# Patient Record
Sex: MALE | Race: BLACK OR AFRICAN AMERICAN | NOT HISPANIC OR LATINO | URBAN - METROPOLITAN AREA
[De-identification: names, ages, dates, MRNs, and addresses within clinical notes are randomized per-mention and may not be internally consistent; named-entity substitution may affect disease eponyms.]

---

## 2019-06-11 ENCOUNTER — EMERGENCY (EMERGENCY)
Facility: HOSPITAL | Age: 64
LOS: 1 days | Discharge: HOME | End: 2019-06-11
Attending: EMERGENCY MEDICINE | Admitting: EMERGENCY MEDICINE
Payer: MEDICAID

## 2019-06-11 VITALS
TEMPERATURE: 98 F | HEIGHT: 70 IN | RESPIRATION RATE: 17 BRPM | WEIGHT: 139.99 LBS | SYSTOLIC BLOOD PRESSURE: 159 MMHG | HEART RATE: 100 BPM | OXYGEN SATURATION: 99 % | DIASTOLIC BLOOD PRESSURE: 92 MMHG

## 2019-06-11 VITALS
HEART RATE: 90 BPM | TEMPERATURE: 99 F | RESPIRATION RATE: 18 BRPM | DIASTOLIC BLOOD PRESSURE: 87 MMHG | SYSTOLIC BLOOD PRESSURE: 144 MMHG | OXYGEN SATURATION: 97 %

## 2019-06-11 LAB
ALBUMIN SERPL ELPH-MCNC: 4.2 G/DL — SIGNIFICANT CHANGE UP (ref 3.3–5)
ALP SERPL-CCNC: 107 U/L — SIGNIFICANT CHANGE UP (ref 40–120)
ALT FLD-CCNC: 13 U/L — SIGNIFICANT CHANGE UP (ref 12–78)
AMPHET UR-MCNC: NEGATIVE — SIGNIFICANT CHANGE UP
ANION GAP SERPL CALC-SCNC: 11 MMOL/L — SIGNIFICANT CHANGE UP (ref 5–17)
APPEARANCE UR: CLEAR — SIGNIFICANT CHANGE UP
APTT BLD: 28.4 SEC — SIGNIFICANT CHANGE UP (ref 27.5–36.3)
AST SERPL-CCNC: 20 U/L — SIGNIFICANT CHANGE UP (ref 15–37)
BACTERIA # UR AUTO: ABNORMAL
BARBITURATES UR SCN-MCNC: NEGATIVE — SIGNIFICANT CHANGE UP
BENZODIAZ UR-MCNC: NEGATIVE — SIGNIFICANT CHANGE UP
BILIRUB SERPL-MCNC: 0.4 MG/DL — SIGNIFICANT CHANGE UP (ref 0.2–1.2)
BILIRUB UR-MCNC: NEGATIVE — SIGNIFICANT CHANGE UP
BUN SERPL-MCNC: 19 MG/DL — SIGNIFICANT CHANGE UP (ref 7–23)
CALCIUM SERPL-MCNC: 10 MG/DL — SIGNIFICANT CHANGE UP (ref 8.5–10.1)
CHLORIDE SERPL-SCNC: 99 MMOL/L — SIGNIFICANT CHANGE UP (ref 96–108)
CO2 SERPL-SCNC: 27 MMOL/L — SIGNIFICANT CHANGE UP (ref 22–31)
COCAINE METAB.OTHER UR-MCNC: NEGATIVE — SIGNIFICANT CHANGE UP
COLOR SPEC: YELLOW — SIGNIFICANT CHANGE UP
CREAT SERPL-MCNC: 1.2 MG/DL — SIGNIFICANT CHANGE UP (ref 0.5–1.3)
DIFF PNL FLD: ABNORMAL
EPI CELLS # UR: NEGATIVE — SIGNIFICANT CHANGE UP
GLUCOSE SERPL-MCNC: 154 MG/DL — HIGH (ref 70–99)
GLUCOSE UR QL: NEGATIVE — SIGNIFICANT CHANGE UP
HCT VFR BLD CALC: 44.2 % — SIGNIFICANT CHANGE UP (ref 39–50)
HGB BLD-MCNC: 14.3 G/DL — SIGNIFICANT CHANGE UP (ref 13–17)
INR BLD: 1.2 RATIO — HIGH (ref 0.88–1.16)
KETONES UR-MCNC: ABNORMAL
LEUKOCYTE ESTERASE UR-ACNC: NEGATIVE — SIGNIFICANT CHANGE UP
MCHC RBC-ENTMCNC: 29.3 PG — SIGNIFICANT CHANGE UP (ref 27–34)
MCHC RBC-ENTMCNC: 32.4 GM/DL — SIGNIFICANT CHANGE UP (ref 32–36)
MCV RBC AUTO: 90.6 FL — SIGNIFICANT CHANGE UP (ref 80–100)
METHADONE UR-MCNC: NEGATIVE — SIGNIFICANT CHANGE UP
NITRITE UR-MCNC: NEGATIVE — SIGNIFICANT CHANGE UP
NRBC # BLD: 0 /100 WBCS — SIGNIFICANT CHANGE UP (ref 0–0)
OPIATES UR-MCNC: POSITIVE — SIGNIFICANT CHANGE UP
PCP SPEC-MCNC: SIGNIFICANT CHANGE UP
PCP UR-MCNC: NEGATIVE — SIGNIFICANT CHANGE UP
PH UR: 7 — SIGNIFICANT CHANGE UP (ref 5–8)
PLATELET # BLD AUTO: 483 K/UL — HIGH (ref 150–400)
POTASSIUM SERPL-MCNC: 4.1 MMOL/L — SIGNIFICANT CHANGE UP (ref 3.5–5.3)
POTASSIUM SERPL-SCNC: 4.1 MMOL/L — SIGNIFICANT CHANGE UP (ref 3.5–5.3)
PROT SERPL-MCNC: 9.6 G/DL — HIGH (ref 6–8.3)
PROT UR-MCNC: 75 MG/DL
PROTHROM AB SERPL-ACNC: 13.6 SEC — HIGH (ref 10–12.9)
RBC # BLD: 4.88 M/UL — SIGNIFICANT CHANGE UP (ref 4.2–5.8)
RBC # FLD: 13.7 % — SIGNIFICANT CHANGE UP (ref 10.3–14.5)
RBC CASTS # UR COMP ASSIST: SIGNIFICANT CHANGE UP /HPF (ref 0–4)
SODIUM SERPL-SCNC: 137 MMOL/L — SIGNIFICANT CHANGE UP (ref 135–145)
SP GR SPEC: 1 — LOW (ref 1.01–1.02)
THC UR QL: NEGATIVE — SIGNIFICANT CHANGE UP
UROBILINOGEN FLD QL: NEGATIVE — SIGNIFICANT CHANGE UP
WBC # BLD: 12.62 K/UL — HIGH (ref 3.8–10.5)
WBC # FLD AUTO: 12.62 K/UL — HIGH (ref 3.8–10.5)
WBC UR QL: SIGNIFICANT CHANGE UP

## 2019-06-11 PROCEDURE — 83690 ASSAY OF LIPASE: CPT

## 2019-06-11 PROCEDURE — 80307 DRUG TEST PRSMV CHEM ANLYZR: CPT

## 2019-06-11 PROCEDURE — 71275 CT ANGIOGRAPHY CHEST: CPT | Mod: 26

## 2019-06-11 PROCEDURE — 80053 COMPREHEN METABOLIC PANEL: CPT

## 2019-06-11 PROCEDURE — 71275 CT ANGIOGRAPHY CHEST: CPT

## 2019-06-11 PROCEDURE — 93010 ELECTROCARDIOGRAM REPORT: CPT

## 2019-06-11 PROCEDURE — 85027 COMPLETE CBC AUTOMATED: CPT

## 2019-06-11 PROCEDURE — 96374 THER/PROPH/DIAG INJ IV PUSH: CPT | Mod: XU

## 2019-06-11 PROCEDURE — 99284 EMERGENCY DEPT VISIT MOD MDM: CPT | Mod: 25

## 2019-06-11 PROCEDURE — 99285 EMERGENCY DEPT VISIT HI MDM: CPT

## 2019-06-11 PROCEDURE — 96361 HYDRATE IV INFUSION ADD-ON: CPT

## 2019-06-11 PROCEDURE — 85730 THROMBOPLASTIN TIME PARTIAL: CPT

## 2019-06-11 PROCEDURE — 36415 COLL VENOUS BLD VENIPUNCTURE: CPT

## 2019-06-11 PROCEDURE — 74174 CTA ABD&PLVS W/CONTRAST: CPT | Mod: 26

## 2019-06-11 PROCEDURE — 81001 URINALYSIS AUTO W/SCOPE: CPT

## 2019-06-11 PROCEDURE — 85610 PROTHROMBIN TIME: CPT

## 2019-06-11 PROCEDURE — 96375 TX/PRO/DX INJ NEW DRUG ADDON: CPT

## 2019-06-11 PROCEDURE — 93005 ELECTROCARDIOGRAM TRACING: CPT

## 2019-06-11 PROCEDURE — 74174 CTA ABD&PLVS W/CONTRAST: CPT

## 2019-06-11 RX ORDER — ONDANSETRON 8 MG/1
4 TABLET, FILM COATED ORAL ONCE
Refills: 0 | Status: COMPLETED | OUTPATIENT
Start: 2019-06-11 | End: 2019-06-11

## 2019-06-11 RX ORDER — SODIUM CHLORIDE 9 MG/ML
1000 INJECTION INTRAMUSCULAR; INTRAVENOUS; SUBCUTANEOUS ONCE
Refills: 0 | Status: COMPLETED | OUTPATIENT
Start: 2019-06-11 | End: 2019-06-11

## 2019-06-11 RX ORDER — PANTOPRAZOLE SODIUM 20 MG/1
40 TABLET, DELAYED RELEASE ORAL ONCE
Refills: 0 | Status: COMPLETED | OUTPATIENT
Start: 2019-06-11 | End: 2019-06-11

## 2019-06-11 RX ADMIN — PANTOPRAZOLE SODIUM 40 MILLIGRAM(S): 20 TABLET, DELAYED RELEASE ORAL at 20:25

## 2019-06-11 RX ADMIN — ONDANSETRON 4 MILLIGRAM(S): 8 TABLET, FILM COATED ORAL at 20:26

## 2019-06-11 RX ADMIN — SODIUM CHLORIDE 1000 MILLILITER(S): 9 INJECTION INTRAMUSCULAR; INTRAVENOUS; SUBCUTANEOUS at 19:30

## 2019-06-11 RX ADMIN — SODIUM CHLORIDE 1000 MILLILITER(S): 9 INJECTION INTRAMUSCULAR; INTRAVENOUS; SUBCUTANEOUS at 20:27

## 2019-06-11 RX ADMIN — SODIUM CHLORIDE 1000 MILLILITER(S): 9 INJECTION INTRAMUSCULAR; INTRAVENOUS; SUBCUTANEOUS at 20:26

## 2019-06-11 NOTE — ED PROVIDER NOTE - OBJECTIVE STATEMENT
63 male presents to ER from [facility] with report of dehydration, altered mental status, generalized weakness, not getting up from bed, vomiting/hematemisis, not controlled with zofran r/o GI bleed. 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

## 2019-06-11 NOTE — ED ADULT NURSE REASSESSMENT NOTE - NS ED NURSE REASSESS COMMENT FT1
patient vomited gastric content x 2 during ED stay but none after meds given and PO challenge successful. Breathing easily, standing up without difficulty, denies abdominal pain. D/C to [facility], awaiting transport.

## 2019-06-11 NOTE — ED ADULT NURSE NOTE - CHIEF COMPLAINT QUOTE
from [facility] voluntary admission for detox from heroin has been vomiting today

## 2019-06-11 NOTE — ED PROVIDER NOTE - PROGRESS NOTE DETAILS
patient feeling well, denies abdominal pain, labs, ct results reviwed, no acute findings, patient wants to go back to [facility], spoke with NP Ewa Dexter out [facility], case discussed, aware patient going back

## 2019-06-11 NOTE — ED ADULT NURSE NOTE - OBJECTIVE STATEMENT
sent from [facility] pt voluntary admission fro heroin detox  c/o uncontrolled vomiting   pt states he uses heroin daily last used yesterday  he is unable to give daily amount of heroin used

## 2020-01-31 PROBLEM — E11.9 TYPE 2 DIABETES MELLITUS WITHOUT COMPLICATIONS: Chronic | Status: ACTIVE | Noted: 2019-06-11

## 2020-01-31 PROBLEM — I10 ESSENTIAL (PRIMARY) HYPERTENSION: Chronic | Status: ACTIVE | Noted: 2019-06-11

## 2020-01-31 PROBLEM — F11.10 OPIOID ABUSE, UNCOMPLICATED: Chronic | Status: ACTIVE | Noted: 2019-06-11

## 2020-02-10 ENCOUNTER — APPOINTMENT (OUTPATIENT)
Dept: SURGERY | Facility: CLINIC | Age: 65
End: 2020-02-10

## 2020-02-13 ENCOUNTER — APPOINTMENT (OUTPATIENT)
Dept: SURGERY | Facility: CLINIC | Age: 65
End: 2020-02-13

## 2020-02-21 NOTE — DATA REVIEWED
[FreeTextEntry1] : Patient: JADEN MORRELL\par YOB: 1955\par Date of Exam: 08-\par  \par EXAM:  ULTRASOUND ABDOMEN COMPLETE\par \par HISTORY:  Recent blood loss\par \par TECHNIQUE:  Using real-time ultrasonography, the abdomen was imaged. Longitudinal and transverse images were obtained. Grayscale and color Doppler imaging is utilized. Static images are provided for review. \par \par COMPARISON:  No old study is available \par \par FINDINGS:  The liver is normal in size and slightly heterogeneous which may represent mild underlying hepatocellular disease. No space-occupying lesions are identified. Portal vein demonstrates normal hepatopedal flow.\par \par Multiple gallstones are present. Gallbladder wall is not thickened.\par \par The common bile duct was identified and measured 0.3 cm. There is no intrahepatic ductal dilation.  \par \par Suboptimal visualization of pancreas by the patient's body habitus.\par \par The kidneys are normal in size, shape, and position.\par The right kidney measures 11.3 x 3.8 x 6.0 cm. Upper pole simple cyst measures 0.8 x 0.8 x 0.9 cm.\par The left kidney measures 12.0 x 5.9 x 6.7 cm. \par There is no evidence of hydronephrosis. No definite stones are identified.  Renal cortex demonstrates normal echogenicity.  \par \par The spleen appears normal measuring approximately 9.1 x 3.7 x 3.3 cm.  \par \par Visualized aorta is not dilated with normal blood flow.\par \par Normal flow is identified within the inferior vena cava.\par \par IMPRESSION:  \par 1. Normal size heterogeneous liver which may represent mild underlying steatosis.\par 2. Cholelithiasis with a normal common bile duct.\par 3. Poor visualization of pancreas.\par 4. Simple cyst upper pole right kidney. \par \par \par \par \par \par Date of Exam: 01-\par \par EXAM:  CT CHEST, ABDOMEN AND PELVIS WITH CONTRAST\par Note - This patient has received 1 CT studies and 0 Myocardial Perfusion studies within our network over the previous 12 month period.\par \par HISTORY:  Persistent eosinophilia.\par TECHNIQUE:  CT scan of the chest was performed. Routine axial, sagittal and coronal, and axial MIP reconstructed images were reviewed. Patient received 100 mL Optiray 350 IV contrast from a 100 mL vial was given. One or more of the following dose reduction techniques were used: automated exposure control, adjustment of the mA and/or kV according to patient size, use of iterative reconstruction technique. \par \par COMPARISON:  Correlation made with CT scan the chest 3/6/2019.\par \par FINDINGS: \par CHEST:\par Osseous structures are unremarkable.\par Thyroid gland is normal in size.\par Anterior chest wall and axillary regions are normal.\par No thoracic lymphadenopathy. Numerous normal-sized lymph nodes are identified.\par No endobronchial lesions are identified.\par Increased markings are identified with a mild predilection for the mid and upper lung zones right greater than left is relatively stable. Areas of paraseptal emphysema with subpleural cysts identified are stable.\par No occult pulmonary mass.\par Mild interstitial changes identified at the bases.\par \par ABDOMEN AND PELVIS:\par Liver: Unremarkable.\par Spleen: Unremarkable.\par Gallbladder: Question small gallbladder polyps which can be confirmed with sonography.\par Bile ducts: No biliary ductal dilatation.\par Pancreas: Unremarkable. No masses. The main pancreatic duct is not dilated. No peripancreatic abnormalities are seen.\par Adrenal glands: Unremarkable. No nodules.\par Kidneys: The kidneys enhance symmetrically without hydronephrosis. No obvious renal calculi are seen.\par Bowel and mesentery: Mild thickening of the colonic wall distally could represent mild colitis. This predominantly involves the distal descending colon and sigmoid colon. No small bowel involvement.\par Visualized abdominal aorta and IVC: Mild calcification of the aorta and iliac arteries with no dilatation. IVC is unremarkable.\par Lymph nodes: No abdominal, pelvic lymphadenopathy.\par Ureters and Urinary bladder: Unremarkable.\par Slightly prominent and nodular prostate.\par Visualized bony structures: Mild degenerative changes L3-4 level.\par Other comments: None.\par \par IMPRESSION:  \par 1. Stable interstitial changes with a predilection for the mid and upper lung zones with areas of paraseptal emphysema.\par 2. No occult pulmonary mass or lymphadenopathy.\par 3. Question mild colitis involving the distal descending colon and sigmoid colon extending into the rectum.\par 4. Question small gallbladder polyps which can be confirmed with sonography.\par

## 2020-02-21 NOTE — HISTORY OF PRESENT ILLNESS
[de-identified] : JADEN MORRELL is referred to the office for consultation visit, he presents w the cc\par Patient had an Abd US done 08/08/19; results c/w Normal size heterogeneous liver which may represent mild underlying steatosis/ Cholelithiasis with a normal common bile duct.\par CT of Abd/Pelvis from 01/22/20 showed stable interstitial changes with a predilection for the mid and upper lung zones with areas of paraseptal emphysema.  No occult pulmonary mass or lymphadenopathy. Question mild colitis involving the distal descending colon and sigmoid colon extending into the rectum/ Question small gallbladder polyps which can be confirmed with sonography.

## 2020-02-24 ENCOUNTER — APPOINTMENT (OUTPATIENT)
Dept: SURGERY | Facility: CLINIC | Age: 65
End: 2020-02-24

## 2021-01-03 NOTE — ED ADULT NURSE NOTE - NS ED NURSE LEVEL OF CONSCIOUSNESS AFFECT
Goals:  -diagnostic tests and consults completed and resulted-partially met, colorectal following  -vital signs normal or at patient baseline- partially met, tachycardic   Calm

## 2021-03-29 ENCOUNTER — TRANSCRIPTION ENCOUNTER (OUTPATIENT)
Age: 66
End: 2021-03-29

## 2021-03-29 ENCOUNTER — INPATIENT (INPATIENT)
Facility: HOSPITAL | Age: 66
LOS: 0 days | Discharge: LEFT BEFORE DISCHARGE | End: 2021-03-29
Attending: INTERNAL MEDICINE | Admitting: INTERNAL MEDICINE
Payer: MEDICAID

## 2021-03-29 VITALS
HEART RATE: 97 BPM | RESPIRATION RATE: 25 BRPM | DIASTOLIC BLOOD PRESSURE: 96 MMHG | SYSTOLIC BLOOD PRESSURE: 141 MMHG | OXYGEN SATURATION: 96 %

## 2021-03-29 VITALS
WEIGHT: 164.91 LBS | OXYGEN SATURATION: 99 % | RESPIRATION RATE: 16 BRPM | DIASTOLIC BLOOD PRESSURE: 85 MMHG | SYSTOLIC BLOOD PRESSURE: 134 MMHG | HEART RATE: 75 BPM | HEIGHT: 70 IN | TEMPERATURE: 98 F

## 2021-03-29 DIAGNOSIS — F14.20 COCAINE DEPENDENCE, UNCOMPLICATED: ICD-10-CM

## 2021-03-29 DIAGNOSIS — F11.20 OPIOID DEPENDENCE, UNCOMPLICATED: ICD-10-CM

## 2021-03-29 LAB
ALBUMIN SERPL ELPH-MCNC: 3.1 G/DL — LOW (ref 3.3–5)
ALP SERPL-CCNC: 63 U/L — SIGNIFICANT CHANGE UP (ref 40–120)
ALT FLD-CCNC: 13 U/L — SIGNIFICANT CHANGE UP (ref 12–78)
ANION GAP SERPL CALC-SCNC: 6 MMOL/L — SIGNIFICANT CHANGE UP (ref 5–17)
APTT BLD: 36.1 SEC — HIGH (ref 27.5–35.5)
AST SERPL-CCNC: 16 U/L — SIGNIFICANT CHANGE UP (ref 15–37)
BASOPHILS # BLD AUTO: 0.05 K/UL — SIGNIFICANT CHANGE UP (ref 0–0.2)
BASOPHILS NFR BLD AUTO: 0.5 % — SIGNIFICANT CHANGE UP (ref 0–2)
BILIRUB SERPL-MCNC: 0.5 MG/DL — SIGNIFICANT CHANGE UP (ref 0.2–1.2)
BUN SERPL-MCNC: 10 MG/DL — SIGNIFICANT CHANGE UP (ref 7–23)
CALCIUM SERPL-MCNC: 8.6 MG/DL — SIGNIFICANT CHANGE UP (ref 8.5–10.1)
CHLORIDE SERPL-SCNC: 106 MMOL/L — SIGNIFICANT CHANGE UP (ref 96–108)
CO2 SERPL-SCNC: 28 MMOL/L — SIGNIFICANT CHANGE UP (ref 22–31)
CREAT SERPL-MCNC: 1.02 MG/DL — SIGNIFICANT CHANGE UP (ref 0.5–1.3)
D DIMER BLD IA.RAPID-MCNC: 413 NG/ML DDU — HIGH
EOSINOPHIL # BLD AUTO: 0.29 K/UL — SIGNIFICANT CHANGE UP (ref 0–0.5)
EOSINOPHIL NFR BLD AUTO: 3.2 % — SIGNIFICANT CHANGE UP (ref 0–6)
FLUAV AG NPH QL: SIGNIFICANT CHANGE UP
FLUBV AG NPH QL: SIGNIFICANT CHANGE UP
GLUCOSE BLDC GLUCOMTR-MCNC: 100 MG/DL — HIGH (ref 70–99)
GLUCOSE BLDC GLUCOMTR-MCNC: 110 MG/DL — HIGH (ref 70–99)
GLUCOSE SERPL-MCNC: 92 MG/DL — SIGNIFICANT CHANGE UP (ref 70–99)
HCT VFR BLD CALC: 41.7 % — SIGNIFICANT CHANGE UP (ref 39–50)
HGB BLD-MCNC: 12.8 G/DL — LOW (ref 13–17)
IMM GRANULOCYTES NFR BLD AUTO: 0.5 % — SIGNIFICANT CHANGE UP (ref 0–1.5)
INR BLD: 1.1 RATIO — SIGNIFICANT CHANGE UP (ref 0.88–1.16)
LYMPHOCYTES # BLD AUTO: 1.62 K/UL — SIGNIFICANT CHANGE UP (ref 1–3.3)
LYMPHOCYTES # BLD AUTO: 17.7 % — SIGNIFICANT CHANGE UP (ref 13–44)
MAGNESIUM SERPL-MCNC: 1.9 MG/DL — SIGNIFICANT CHANGE UP (ref 1.6–2.6)
MCHC RBC-ENTMCNC: 30 PG — SIGNIFICANT CHANGE UP (ref 27–34)
MCHC RBC-ENTMCNC: 30.7 GM/DL — LOW (ref 32–36)
MCV RBC AUTO: 97.7 FL — SIGNIFICANT CHANGE UP (ref 80–100)
MONOCYTES # BLD AUTO: 0.49 K/UL — SIGNIFICANT CHANGE UP (ref 0–0.9)
MONOCYTES NFR BLD AUTO: 5.3 % — SIGNIFICANT CHANGE UP (ref 2–14)
NEUTROPHILS # BLD AUTO: 6.67 K/UL — SIGNIFICANT CHANGE UP (ref 1.8–7.4)
NEUTROPHILS NFR BLD AUTO: 72.8 % — SIGNIFICANT CHANGE UP (ref 43–77)
NRBC # BLD: 0 /100 WBCS — SIGNIFICANT CHANGE UP (ref 0–0)
NT-PROBNP SERPL-SCNC: 1674 PG/ML — HIGH (ref 0–125)
PLATELET # BLD AUTO: 307 K/UL — SIGNIFICANT CHANGE UP (ref 150–400)
POTASSIUM SERPL-MCNC: 4 MMOL/L — SIGNIFICANT CHANGE UP (ref 3.5–5.3)
POTASSIUM SERPL-SCNC: 4 MMOL/L — SIGNIFICANT CHANGE UP (ref 3.5–5.3)
PROCALCITONIN SERPL-MCNC: 0.05 NG/ML — SIGNIFICANT CHANGE UP (ref 0.02–0.1)
PROT SERPL-MCNC: 7.8 GM/DL — SIGNIFICANT CHANGE UP (ref 6–8.3)
PROTHROM AB SERPL-ACNC: 12.7 SEC — SIGNIFICANT CHANGE UP (ref 10.6–13.6)
RBC # BLD: 4.27 M/UL — SIGNIFICANT CHANGE UP (ref 4.2–5.8)
RBC # FLD: 12.9 % — SIGNIFICANT CHANGE UP (ref 10.3–14.5)
SARS-COV-2 RNA SPEC QL NAA+PROBE: SIGNIFICANT CHANGE UP
SODIUM SERPL-SCNC: 140 MMOL/L — SIGNIFICANT CHANGE UP (ref 135–145)
TROPONIN I SERPL-MCNC: 0.03 NG/ML — SIGNIFICANT CHANGE UP (ref 0.01–0.04)
WBC # BLD: 9.17 K/UL — SIGNIFICANT CHANGE UP (ref 3.8–10.5)
WBC # FLD AUTO: 9.17 K/UL — SIGNIFICANT CHANGE UP (ref 3.8–10.5)

## 2021-03-29 PROCEDURE — 71275 CT ANGIOGRAPHY CHEST: CPT | Mod: 26,MH

## 2021-03-29 PROCEDURE — 90792 PSYCH DIAG EVAL W/MED SRVCS: CPT

## 2021-03-29 PROCEDURE — 99223 1ST HOSP IP/OBS HIGH 75: CPT

## 2021-03-29 PROCEDURE — 99285 EMERGENCY DEPT VISIT HI MDM: CPT

## 2021-03-29 PROCEDURE — 71045 X-RAY EXAM CHEST 1 VIEW: CPT | Mod: 26

## 2021-03-29 PROCEDURE — 93010 ELECTROCARDIOGRAM REPORT: CPT

## 2021-03-29 RX ORDER — DEXTROSE 50 % IN WATER 50 %
25 SYRINGE (ML) INTRAVENOUS ONCE
Refills: 0 | Status: DISCONTINUED | OUTPATIENT
Start: 2021-03-29 | End: 2021-03-29

## 2021-03-29 RX ORDER — ATORVASTATIN CALCIUM 80 MG/1
10 TABLET, FILM COATED ORAL AT BEDTIME
Refills: 0 | Status: DISCONTINUED | OUTPATIENT
Start: 2021-03-29 | End: 2021-03-29

## 2021-03-29 RX ORDER — INSULIN LISPRO 100/ML
VIAL (ML) SUBCUTANEOUS
Refills: 0 | Status: DISCONTINUED | OUTPATIENT
Start: 2021-03-29 | End: 2021-03-29

## 2021-03-29 RX ORDER — HEPARIN SODIUM 5000 [USP'U]/ML
5000 INJECTION INTRAVENOUS; SUBCUTANEOUS EVERY 12 HOURS
Refills: 0 | Status: DISCONTINUED | OUTPATIENT
Start: 2021-03-29 | End: 2021-03-29

## 2021-03-29 RX ORDER — METOPROLOL TARTRATE 50 MG
25 TABLET ORAL DAILY
Refills: 0 | Status: DISCONTINUED | OUTPATIENT
Start: 2021-03-29 | End: 2021-03-29

## 2021-03-29 RX ORDER — GLUCAGON INJECTION, SOLUTION 0.5 MG/.1ML
1 INJECTION, SOLUTION SUBCUTANEOUS ONCE
Refills: 0 | Status: DISCONTINUED | OUTPATIENT
Start: 2021-03-29 | End: 2021-03-29

## 2021-03-29 RX ORDER — SODIUM CHLORIDE 9 MG/ML
1000 INJECTION, SOLUTION INTRAVENOUS
Refills: 0 | Status: DISCONTINUED | OUTPATIENT
Start: 2021-03-29 | End: 2021-03-29

## 2021-03-29 RX ORDER — FUROSEMIDE 40 MG
40 TABLET ORAL ONCE
Refills: 0 | Status: COMPLETED | OUTPATIENT
Start: 2021-03-29 | End: 2021-03-29

## 2021-03-29 RX ORDER — METFORMIN HYDROCHLORIDE 850 MG/1
1 TABLET ORAL
Qty: 0 | Refills: 0 | DISCHARGE

## 2021-03-29 RX ORDER — ASPIRIN/CALCIUM CARB/MAGNESIUM 324 MG
81 TABLET ORAL DAILY
Refills: 0 | Status: DISCONTINUED | OUTPATIENT
Start: 2021-03-30 | End: 2021-03-29

## 2021-03-29 RX ORDER — FUROSEMIDE 40 MG
40 TABLET ORAL DAILY
Refills: 0 | Status: DISCONTINUED | OUTPATIENT
Start: 2021-03-29 | End: 2021-03-29

## 2021-03-29 RX ORDER — DEXTROSE 50 % IN WATER 50 %
15 SYRINGE (ML) INTRAVENOUS ONCE
Refills: 0 | Status: DISCONTINUED | OUTPATIENT
Start: 2021-03-29 | End: 2021-03-29

## 2021-03-29 RX ORDER — DEXTROSE 50 % IN WATER 50 %
12.5 SYRINGE (ML) INTRAVENOUS ONCE
Refills: 0 | Status: DISCONTINUED | OUTPATIENT
Start: 2021-03-29 | End: 2021-03-29

## 2021-03-29 RX ORDER — ASPIRIN/CALCIUM CARB/MAGNESIUM 324 MG
325 TABLET ORAL ONCE
Refills: 0 | Status: COMPLETED | OUTPATIENT
Start: 2021-03-29 | End: 2021-03-29

## 2021-03-29 RX ADMIN — Medication 325 MILLIGRAM(S): at 08:46

## 2021-03-29 RX ADMIN — Medication 40 MILLIGRAM(S): at 11:50

## 2021-03-29 NOTE — CONSULT NOTE ADULT - ASSESSMENT
`The chart has been reviewed but the patient has not yet been examined.  Full note to follow. 65y Male with a known h/o DM 2 (on OHG agents), HTN, HLD, active daily  drug  abuser (heroin and cocaine - uses >20 bags qd as per pt).    He presented to the ED with c/o dyspnea, pleuritic left sided non-radiating chest wall pain. No h/o fever, NV, abd pain. he is a current smoker ~1ppd.    No evidence of significant heart failure clinically; CT findings of increased pulmonary markings represent pulm fibrosis, mor likely.  Consider pulm consultation.    Empiric Lasix as needed.  Continue Enalapril.  TTE pending.        Patient s/o AMA, will follow as needed.

## 2021-03-29 NOTE — CONSULT NOTE ADULT - SUBJECTIVE AND OBJECTIVE BOX
CARDIOLOGY CONSULTATION NOTE                                                                             JADEN MORRELL is a 65y Male with a known h/o DM 2 (on OHG agents), HTN, HLD, active daily  drug  abuser (heroin and cocaine - snorts about 7 bags qd as per pt).  He presented to the ED with c/o dyspnea, pleuritic left sided non-radiating chest wall pain. No h/o fever, NV, abd pain. he is a current smoker ~1ppd. He denies  etoh use. Pt lives with his sister and does not know his meds (29 Mar 2021 11:59)   REVIEW OF SYSTEMS: -----------------------------  CONSTITUTIONAL: No fever, weight loss, or fatigue EYES: No eye pain, visual disturbances, or discharge ENMT:  No difficulty hearing, tinnitus, vertigo; No sinus or throat pain NECK: No pain or stiffness BREASTS: No pain, masses, or nipple discharge RESPIRATORY: No cough, wheezing, chills or hemoptysis; No shortness of breath CARDIOVASCULAR: See HPI GASTROINTESTINAL: No abdominal or epigastric pain. No nausea, vomiting, or hematemesis; No diarrhea or constipation. No melena or hematochezia. GENITOURINARY: No dysuria, frequency, hematuria, or incontinence NEUROLOGICAL: No headaches, memory loss, loss of strength, numbness, or tremors SKIN: No itching, burning, rashes, or lesions  LYMPH NODES: No enlarged glands ENDOCRINE: No heat or cold intolerance; No hair loss MUSCULOSKELETAL: No joint pain or swelling; No muscle, back, or extremity pain PSYCHIATRIC: No depression, anxiety, mood swings, or difficulty sleeping HEME/LYMPH: No easy bruising, or bleeding gums ALLERGY AND IMMUNOLOGIC: No hives or eczema  Home Medications: enalapril:  (12 Jun 2019 14:33) enalapril 5 mg oral tablet: 1 tab(s) orally once a day (at bedtime) (21 Nov 2014 14:15) Januvia 100 mg oral tablet: 1 tab(s) orally once a day (21 Nov 2014 14:33) metFORMIN 1000 mg oral tablet: 1 tab(s) orally 2 times a day (21 Nov 2014 14:33) metFORMIN 500 mg oral tablet: 1 tab(s) orally 2 times a day (12 Jun 2019 14:33)   MEDICATIONS  (STANDING): aspirin enteric coated 81 milliGRAM(s) Oral daily atorvastatin 10 milliGRAM(s) Oral at bedtime enalapril 5 milliGRAM(s) Oral daily furosemide   Injectable 40 milliGRAM(s) IV Push daily glucagon  Injectable 1 milliGRAM(s) IntraMuscular once heparin   Injectable 5000 Unit(s) SubCutaneous every 12 hours insulin lispro (ADMELOG) corrective regimen sliding scale   SubCutaneous three times a day before meals   ALLERGIES: penicillin (Rash) penicillin (Unknown)   FAMILY HISTORY: Family history of hypertension (Father)  Family history of diabetes mellitus (Mother)    PHYSICAL EXAMINATION: ----------------------------- T(C): 36.6 (03-29-21 @ 10:53), Max: 36.6 (03-29-21 @ 07:52) HR: 97 (03-29-21 @ 12:58) (75 - 100) BP: 141/96 (03-29-21 @ 12:58) (134/85 - 159/103) RR: 25 (03-29-21 @ 12:58) (16 - 25) SpO2: 96% (03-29-21 @ 12:58) (95% - 99%) Wt(kg): --  Height (cm): 177.8 (03-29 @ 07:52) Weight (kg): 74.8 (03-29 @ 07:52) BMI (kg/m2): 23.7 (03-29 @ 07:52) BSA (m2): 1.92 (03-29 @ 07:52)  Constitutional: well developed, normal appearance, well groomed, well nourished, no deformities and no acute distress.  Eyes: the conjunctiva exhibited no abnormalities and the eyelids demonstrated no xanthelasmas.  HEENT: normal oral mucosa, no oral pallor and no oral cyanosis.  Neck: normal jugular venous A waves present, normal jugular venous V waves present and no jugular venous karimi A waves.  Pulmonary: no respiratory distress, normal respiratory rhythm and effort, no accessory muscle use and lungs were clear to auscultation bilaterally.  Cardiovascular: heart rate and rhythm were normal, normal S1 and S2 and no murmur, gallop, rub, heave or thrill are present.  Abdomen: soft, non-tender, no hepato-splenomegaly and no abdominal mass palpated.  Musculoskeletal: the gait could not be assessed..  Extremities: no clubbing of the fingernails, no localized cyanosis, no petechial hemorrhages and no ischemic changes.  Skin: normal skin color and pigmentation, no rash, no venous stasis, no skin lesions, no skin ulcer and no xanthoma was observed.  Psychiatric: oriented to person, place, and time, the affect was normal, the mood was normal and not feeling anxious.   ECG: -------  < from: 12 Lead ECG (03.29.21 @ 08:05) >  Ventricular Rate 85 BPM  Atrial Rate 85 BPM  P-R Interval 152 ms  QRS Duration 92 ms  Q-T Interval 386 ms  QTC Calculation(Bazett) 459 ms  P Axis 63 degrees  R Axis 72 degrees  T Axis 116 degrees  Diagnosis Line Normal sinus rhythm Possible Left atrial enlargement Left ventricular hypertrophy T wave abnormality, consider lateral ischemia Abnormal ECG No previous ECGs available Confirmed by Max Peña MD ([id number]) on 3/29/2021 1:22:24 PM  < end of copied text >   LABS:  -------- 03-29  140  |  106  |  10 ----------------------------<  92 4.0   |  28  |  1.02  Ca    8.6      29 Mar 2021 08:16 Mg     1.9     03-29  TPro  7.8  /  Alb  3.1<L>  /  TBili  0.5  /  DBili  x   /  AST  16  /  ALT  13  /  AlkPhos  63  03-29                       12.8  9.17  )-----------( 307      ( 29 Mar 2021 08:16 )            41.7    PT/INR - ( 29 Mar 2021 08:16 )   PT: 12.7 sec;   INR: 1.10 ratio      PTT - ( 29 Mar 2021 08:16 )  PTT:36.1 sec 03-29 @ 08:16 BNP: 1674 pg/mL  03-29 @ 08:16 CPK total:--, CKMB --, Troponin I - .030 ng/mL     RADIOLOGY REPORTS: -----------------------------  < from: CT Angio Chest w/ IV Cont (03.29.21 @ 10:52) >  EXAM:  CT ANGIO CHEST (W)AW IC                         PROCEDURE DATE:  03/29/2021      INTERPRETATION:  CLINICAL INFORMATION: Chest pain  COMPARISON: 6/11/2019  CONTRAST/COMPLICATIONS: IV Contrast: Omnipaque 350  85 cc administered   15 cc discarded Oral Contrast: NONE Complications: None reported at time of study completion  PROCEDURE: CT Angiography of the Chest. Sagittal and coronal reformats were performed as well as 3D (MIP) reconstructions.  FINDINGS:  LUNGS AND AIRWAYS: Patent central airways.  Emphysema. Bilateral groundglass opacities and septal thickening. Fibrotic changes (subpleural cysts). PLEURA: Small bilateral pleural effusions. MEDIASTINUM AND DEIRDRE: Prominent lymph nodes. VESSELS: No pulmonary arterial filling defects. HEART: Heart size is normal. No pericardial effusion. CHEST WALL AND LOWER NECK: Within normal limits. VISUALIZED UPPER ABDOMEN: Reflux of intravenous contrast into the inferior vena cava and hepatic veins. BONES: Within normal limits.  IMPRESSION: Negative for pulmonary embolism. Findings of pulmonary fibrosis and pulmonary edema. Small bilateral pleural effusions.   PAVAN DE DIOS MD; Attending Radiologist This document has been electronically signed. Mar29 2021 11:25AM  < end of copied text >   ECHOCARDIOGRAM: --------------------------- pending      CARDIOLOGY CONSULTATION NOTE                                                                             JADEN MORRELL is a 65y Male with a known h/o DM 2 (on OHG agents), HTN, HLD, active daily  drug  abuser (heroin and cocaine - snorts about 7 bags qd as per pt).  He presented to the ED with c/o dyspnea, pleuritic left sided non-radiating chest wall pain. No h/o fever, NV, abd pain. he is a current smoker ~1ppd. He denies  etoh use. Pt lives with his sister and does not know his meds (29 Mar 2021 11:59)   REVIEW OF SYSTEMS: -----------------------------  CONSTITUTIONAL: No fever, weight loss, or fatigue EYES: No eye pain, visual disturbances, or discharge ENMT:  No difficulty hearing, tinnitus, vertigo; No sinus or throat pain NECK: No pain or stiffness BREASTS: No pain, masses, or nipple discharge RESPIRATORY: No cough, wheezing, chills or hemoptysis; No shortness of breath CARDIOVASCULAR: See HPI GASTROINTESTINAL: No abdominal or epigastric pain. No nausea, vomiting, or hematemesis; No diarrhea or constipation. No melena or hematochezia. GENITOURINARY: No dysuria, frequency, hematuria, or incontinence NEUROLOGICAL: No headaches, memory loss, loss of strength, numbness, or tremors SKIN: No itching, burning, rashes, or lesions  LYMPH NODES: No enlarged glands ENDOCRINE: No heat or cold intolerance; No hair loss MUSCULOSKELETAL: No joint pain or swelling; No muscle, back, or extremity pain PSYCHIATRIC: No depression, anxiety, mood swings, or difficulty sleeping HEME/LYMPH: No easy bruising, or bleeding gums ALLERGY AND IMMUNOLOGIC: No hives or eczema  Home Medications: enalapril:  (12 Jun 2019 14:33) enalapril 5 mg oral tablet: 1 tab(s) orally once a day (at bedtime) (21 Nov 2014 14:15) Januvia 100 mg oral tablet: 1 tab(s) orally once a day (21 Nov 2014 14:33) metFORMIN 1000 mg oral tablet: 1 tab(s) orally 2 times a day (21 Nov 2014 14:33) metFORMIN 500 mg oral tablet: 1 tab(s) orally 2 times a day (12 Jun 2019 14:33)   MEDICATIONS  (STANDING): aspirin enteric coated 81 milliGRAM(s) Oral daily atorvastatin 10 milliGRAM(s) Oral at bedtime enalapril 5 milliGRAM(s) Oral daily furosemide   Injectable 40 milliGRAM(s) IV Push daily glucagon  Injectable 1 milliGRAM(s) IntraMuscular once heparin   Injectable 5000 Unit(s) SubCutaneous every 12 hours insulin lispro (ADMELOG) corrective regimen sliding scale   SubCutaneous three times a day before meals   ALLERGIES: penicillin (Rash) penicillin (Unknown)   FAMILY HISTORY: Family history of hypertension (Father)  Family history of diabetes mellitus (Mother)    PHYSICAL EXAMINATION: ----------------------------- T(C): 36.6 (03-29-21 @ 10:53), Max: 36.6 (03-29-21 @ 07:52) HR: 97 (03-29-21 @ 12:58) (75 - 100) BP: 141/96 (03-29-21 @ 12:58) (134/85 - 159/103) RR: 25 (03-29-21 @ 12:58) (16 - 25) SpO2: 96% (03-29-21 @ 12:58) (95% - 99%) Wt(kg): --  Height (cm): 177.8 (03-29 @ 07:52) Weight (kg): 74.8 (03-29 @ 07:52) BMI (kg/m2): 23.7 (03-29 @ 07:52) BSA (m2): 1.92 (03-29 @ 07:52)  Constitutional: well developed, normal appearance, well groomed, well nourished, no deformities and no acute distress.  Eyes: the conjunctiva exhibited no abnormalities and the eyelids demonstrated no xanthelasmas.  HEENT: normal oral mucosa, no oral pallor and no oral cyanosis.  Neck: normal jugular venous A waves present, normal jugular venous V waves present and no jugular venous karimi A waves.  Pulmonary: no respiratory distress, normal respiratory rhythm and effort, no accessory muscle use and lungs were clear to auscultation bilaterally.  Cardiovascular: heart rate and rhythm were normal, normal S1 and S2 and no murmur, gallop, rub, heave or thrill are present.  Abdomen: soft, non-tender, no hepato-splenomegaly and no abdominal mass palpated.  Musculoskeletal: the gait could not be assessed..  Extremities: no clubbing of the fingernails, no localized cyanosis, no petechial hemorrhages and no ischemic changes.  Skin: normal skin color and pigmentation, no rash, no venous stasis, no skin lesions, no skin ulcer and no xanthoma was observed.  Psychiatric: oriented to person, place, and time, the affect was normal, the mood was normal and not feeling anxious.   ECG: -------  < from: 12 Lead ECG (03.29.21 @ 08:05) >  Ventricular Rate 85 BPM  Atrial Rate 85 BPM  P-R Interval 152 ms  QRS Duration 92 ms  Q-T Interval 386 ms  QTC Calculation(Bazett) 459 ms  P Axis 63 degrees  R Axis 72 degrees  T Axis 116 degrees  Diagnosis Line Normal sinus rhythm Possible Left atrial enlargement Left ventricular hypertrophy T wave abnormality, consider lateral ischemia Abnormal ECG No previous ECGs available Confirmed by Max Peña MD ([id number]) on 3/29/2021 1:22:24 PM  < end of copied text >   LABS:  -------- 03-29  140  |  106  |  10 ----------------------------<  92 4.0   |  28  |  1.02  Ca    8.6      29 Mar 2021 08:16 Mg     1.9     03-29  TPro  7.8  /  Alb  3.1<L>  /  TBili  0.5  /  DBili  x   /  AST  16  /  ALT  13  /  AlkPhos  63  03-29                       12.8  9.17  )-----------( 307      ( 29 Mar 2021 08:16 )            41.7    PT/INR - ( 29 Mar 2021 08:16 )   PT: 12.7 sec;   INR: 1.10 ratio      PTT - ( 29 Mar 2021 08:16 )  PTT:36.1 sec 03-29 @ 08:16 BNP: 1674 pg/mL  03-29 @ 08:16 CPK total:--, CKMB --, Troponin I - .030 ng/mL     RADIOLOGY REPORTS: -----------------------------  < from: CT Angio Chest w/ IV Cont (03.29.21 @ 10:52) >  EXAM:  CT ANGIO CHEST (W)AW IC                         PROCEDURE DATE:  03/29/2021      INTERPRETATION:  CLINICAL INFORMATION: Chest pain  COMPARISON: 6/11/2019  CONTRAST/COMPLICATIONS: IV Contrast: Omnipaque 350  85 cc administered   15 cc discarded Oral Contrast: NONE Complications: None reported at time of study completion  PROCEDURE: CT Angiography of the Chest. Sagittal and coronal reformats were performed as well as 3D (MIP) reconstructions.  FINDINGS:  LUNGS AND AIRWAYS: Patent central airways.  Emphysema. Bilateral groundglass opacities and septal thickening. Fibrotic changes (subpleural cysts). PLEURA: Small bilateral pleural effusions. MEDIASTINUM AND DEIRDRE: Prominent lymph nodes. VESSELS: No pulmonary arterial filling defects. HEART: Heart size is normal. No pericardial effusion. CHEST WALL AND LOWER NECK: Within normal limits. VISUALIZED UPPER ABDOMEN: Reflux of intravenous contrast into the inferior vena cava and hepatic veins. BONES: Within normal limits.  IMPRESSION: Negative for pulmonary embolism. Findings of pulmonary fibrosis and pulmonary edema. Small bilateral pleural effusions.   PAVAN DE DIOS MD; Attending Radiologist This document has been electronically signed. Mar29 2021 11:25AM  < end of copied text >   ECHOCARDIOGRAM: ---------------------------

## 2021-03-29 NOTE — H&P ADULT - HISTORY OF PRESENT ILLNESS
64yo male     with pmh DM 2,  , HTN, HLD,        active daily  drug  abuser, + heroine  and  +cocaine .  dialy snorting,  about  7  bags  qd, per pt       presents with sob, pleuritic left sided cp, nonradiating.       denies fever, NV, abd pain. + smoker  1pp/day   pt denies  etoh use    has  new  onset chf  , in  er    pt lives  with his sister   and  does  not   know his meds 64yo male     with pmh DM 2,on metformin,   , HTN, HLD,        active daily  drug  abuser, + heroine  and  +cocaine .  dialy snorting,  about  7  bags  qd, per pt       presents with sob, pleuritic left sided cp, nonradiating.       denies fever, NV, abd pain. + smoker  1pp/day   pt denies  etoh use    has  new  onset chf  , in  er    pt lives  with his sister   and  does  not   know his meds

## 2021-03-29 NOTE — DISCHARGE NOTE PROVIDER - HOSPITAL COURSE
65  yr male,       h/o  DM 2 on metformin,   HTN, HLD,      does not  know his meds/ ?  non compliance  with meds     active daily  drug  abuser, + heroine  and  +cocaine .  daily  snorting,  about  7  bags  qd, per pt .  daily smoker     pt  is unemployed   and lives  with his sister         Admitted  with sob, pleuritic left sided cp, nonradiating., for past  week  or more       cp has   resolved               1.  sob,  from  Acute Diastolic Chf          Ct chest  angio, no PE/ pulm fibrosis and pulm edema            cp, sob, chf,  pneumonitis, pulm fibrosis, all known to occur as  a consequence of   illicit drug inhalation           normal  troponin. elevated   BNP      2.  Ekg, ,T  wave inversions            card dr ivey          on iv lasix, oxygen          on  asa.  lipitor, enalapril          will  need ischemic  w/p, when stable        3. active  Cocaine, Heroin  daily  abuse,  and  daily smoker        with   h/o daily cocaine use, will refrain from  BB        smoking., drug  cessation. counselling  done, pt not ready to quit          on dvt ppx        notified  by  team , that pt  has  eloped/  without signing  AMA   pt  called,  unable  to reach

## 2021-03-29 NOTE — CHART NOTE - NSCHARTNOTEFT_GEN_A_CORE
Patient left AMA without being made aware of consequences of leaving against medical advice including harm, injury or death. Pt did not sign AMA form. Pt evaluated by psych and deemed to have capacity. Will notify Dr. Ponce.    Leonora Cowan PA-C Patient eloped without signing AMA form. Pt evaluated by psych and deemed to have capacity. Will notify Dr. Ponce.    Leonora Cowan PA-C

## 2021-03-29 NOTE — ED PROVIDER NOTE - OBJECTIVE STATEMENT
64yo male with pmh DM, HTN, HL, + heroine snorted, +cocaine use presents with sob, pleuritic left sided cp, nonradiating. pt seems to want to sleep and not talk. admits to heroine and cocaine yesterday. denies fever, NV, abd pain. + smoker    No fever/chills, No photophobia/eye pain/changes in vision, No ear pain/sore throat/dysphagia, + chest pain, no palpitations, + SOB, no wheeze/stridor, No abdominal pain, No N/V/D, no dysuria/frequency/discharge, No neck/back pain, no rash, no changes in neurological status/function.

## 2021-03-29 NOTE — H&P ADULT - NSHPLABSRESULTS_GEN_ALL_CORE
LABS:                        12.8   9.17  )-----------( 307      ( 29 Mar 2021 08:16 )             41.7     03-29    140  |  106  |  10  ----------------------------<  92  4.0   |  28  |  1.02    Ca    8.6      29 Mar 2021 08:16  Mg     1.9     03-29    TPro  7.8  /  Alb  3.1<L>  /  TBili  0.5  /  DBili  x   /  AST  16  /  ALT  13  /  AlkPhos  63  03-29    PT/INR - ( 29 Mar 2021 08:16 )   PT: 12.7 sec;   INR: 1.10 ratio         PTT - ( 29 Mar 2021 08:16 )  PTT:36.1 sec  CARDIAC MARKERS ( 29 Mar 2021 08:16 )  .030 ng/mL / x     / x     / x     / x

## 2021-03-29 NOTE — H&P ADULT - ASSESSMENT
65  yr male,    with pmh DM 2,  , HTN, HLD,     active daily  drug  abuser, + heroine  and  +cocaine .  daily  snorting,  about  7  bags  qd, per pt .  daily smoker         presents with sob, pleuritic left sided cp, nonradiating., for past  week  or more    cp has   resolved      pt lives  with his sister   and  does  not   know his meds,  and is  unemployed       sob,  from  acute diastolic chf    normal  troponin. elevated  BNP     tele, ekg, ,t  wave inversions    card dr ivey   on iv lasix     on  asa.  lipitor, enalapril    active  cocaine.  heroin  daily  abuse/  daily smoker    with daily cocaine use, will refrain from  BB    smoking cessation. counselling done, pt not ready to quit now   on dvt ppx      65  yr male,    with pmh DM 2,  , HTN, HLD,     active daily  drug  abuser, + heroine  and  +cocaine .  daily  snorting,  about  7  bags  qd, per pt .  daily smoker         presents with sob, pleuritic left sided cp, nonradiating., for past  week  or more    cp has   resolved      pt lives  with his sister   and  does  not   know his meds,  and is  unemployed       sob,  from  acute diastolic chf    normal  troponin. elevated  BNP     tele, ekg, ,T  wave inversions      CT angio chest, no pe/  chf/ pulm fibrosis   card dr ivey   on iv lasix, oxygen     on  asa.  lipitor, enalapril    active  cocaine.  heroin  daily  abuse/  daily smoker    with daily cocaine use, will refrain from  BB    smoking cessation. counselling done, pt not ready to quit now   on dvt ppx      65  yr male,    with pmh DM 2,  , HTN, HLD,     active daily  drug  abuser, + heroine  and  +cocaine .  daily  snorting,  about  7  bags  qd, per pt .  daily smoker         presents with sob, pleuritic left sided cp, nonradiating., for past  week  or more    cp has   resolved      pt lives  with his sister   and  does  not   know his meds,  and is  unemployed       sob,  from  acute diastolic chf     cp, sob, chf,  pneumonitis, pulm fibrosis, all known to occur as  a consequence of   illicit drug inhalation    normal  troponin. elevated  BNP     tele monitoring  , Ekg, ,T  wave inversions , and,  CT angio chest, no pe/  chf/ pulm fibrosis   card dr ivey   on iv lasix, oxygen     on  asa.  lipitor, enalapril    active  cocaine.  heroin  daily  abuse/  daily smoker    with daily cocaine use, will refrain from  BB    smoking. drug  cessation. counselling done, pt not ready to quit now   on dvt ppx      65  yr male,       h/o  DM 2 on metformin,   HTN, HLD,      does not  know his meds/ ?  non compliance  with meds     active daily  drug  abuser, + heroine  and  +cocaine .  daily  snorting,  about  7  bags  qd, per pt .  daily smoker     pt  is unemployed   and lives  with his sister         Admitted  with sob, pleuritic left sided cp, nonradiating., for past  week  or more       cp has   resolved               1.  sob,  from  Acute Diastolic Chf          Ct chest  angio, no PE/ pulm fibrosis and pulm edema            cp, sob, chf,  pneumonitis, pulm fibrosis, all known to occur as  a consequence of   illicit drug inhalation           normal  troponin. elevated   BNP      2.  Ekg, ,T  wave inversions            card dr ivey          on iv lasix, oxygen          on  asa.  lipitor, enalapril          will  need ischemic  w/p, when stable        3. active  Cocaine, Heroin  daily  abuse,  and  daily smoker        with   h/o daily cocaine use, will refrain from  BB        smoking., drug  cessation. counselling  done, pt not ready to quit          on dvt ppx

## 2021-03-29 NOTE — H&P ADULT - NSHPREVIEWOFSYSTEMS_GEN_ALL_CORE
REVIEW OF SYSTEMS:  GEN: no fever,    no chills  RESP:  SOB,   no cough  CVS: no chest pain,   no palpitations  GI: no abdominal pain,   no nausea,   no vomiting,   no constipation,   no diarrhea  : no dysuria,   no frequency  NEURO: no headache,   no dizziness  PSYCH: no depression,   not anxious  Derm : no rash

## 2021-03-29 NOTE — BEHAVIORAL HEALTH ASSESSMENT NOTE - NSBHREFERIPTEAMCONTACT_PSY_A_CORE_FT
Pre-Operative Progress Note


H&P Reviewed


The H&P was reviewed, patient examined and no changes noted.


Date H&P Reviewed:  May 11, 2017


Time H&P Reviewed:  10:40


Pre-Operative Diagnosis:  ventral abdominal incisional hernia











ABISAI MOORE MD May 11, 2017 10:54 am #

## 2021-03-29 NOTE — ED PROVIDER NOTE - PHYSICAL EXAMINATION
Gen: Alert, Well appearing. NAD , comfortable, no distress   Head: NC, AT, PERRL, normal lids/conjunctiva   ENT: Bilateral TM WNL, patent oropharynx without erythema/exudate, uvula midline  Neck: supple, no tenderness/meningismus  Pulm: Bilateral clear BS, normal resp effort  CV: RRR, no M/R/G, +dist pulses   Abd: soft, NT/ND, +BS, no guarding/rebound tenderness  Mskel:  no edema/erythema/cyanosis   Skin: no rash, no bruising  Neuro: AAOx3, no sensory/motor deficits, CN 2-12 intact

## 2021-03-29 NOTE — H&P ADULT - NSHPPHYSICALEXAM_GEN_ALL_CORE
PHYSICAL EXAMINATION:  Vital Signs Last 24 Hrs  T(C): 36.6 (29 Mar 2021 10:53), Max: 36.6 (29 Mar 2021 07:52)  T(F): 97.9 (29 Mar 2021 10:53), Max: 97.9 (29 Mar 2021 07:52)  HR: 89 (29 Mar 2021 10:53) (75 - 89)  BP: 159/103 (29 Mar 2021 10:53) (134/85 - 159/103)  BP(mean): --  RR: 21 (29 Mar 2021 10:53) (16 - 21)  SpO2: 95% (29 Mar 2021 10:53) (95% - 99%)  CAPILLARY BLOOD GLUCOSE      POCT Blood Glucose.: 110 mg/dL (29 Mar 2021 08:02)        GENERAL: NAD, well-groomed,  HEAD:  atraumatic, normocephalic  EYES: sclera anicteric  ENMT: mucous membranes moist  NECK: supple, No JVD  CHEST/LUNG: clear to auscultation bilaterally;    no      rales   ,   no rhonchi,   HEART: normal S1, S2  ABDOMEN: BS+, soft, ND, NT   EXTREMITIES:    no    edema    b/l LEs  NEURO: awake, ,     moves all extremities  SKIN: no     rash

## 2021-03-29 NOTE — ED ADULT NURSE NOTE - OBJECTIVE STATEMENT
Pt received in bed alert and oriented and resting in bed with the c/o left chest pain which  worsens on palpated and or when moving and taking a deep breath. Pt denies exposer to covid 19. Pt admits to being an heroin and cocaine user and he states that he snorts it on a regular basis. Pt last used heroin and cocaine last night. Pt is hypoxic and orthopneic. Pt placed on 2L NC and head elevated on 45 degrees for better oxygenation. As per Md's orders IV yuli placed blood specimen obtained and sent to the lab. Pt stable and nursing care ongoing and safety maintained.

## 2021-03-29 NOTE — BEHAVIORAL HEALTH ASSESSMENT NOTE - SUICIDE PROTECTIVE FACTORS
Identifies reasons for living/Has future plans/Supportive social network of family or friends/Cheondoism beliefs

## 2021-03-29 NOTE — BEHAVIORAL HEALTH ASSESSMENT NOTE - HPI (INCLUDE ILLNESS QUALITY, SEVERITY, DURATION, TIMING, CONTEXT, MODIFYING FACTORS, ASSOCIATED SIGNS AND SYMPTOMS)
66 yo AAM, single, noncaregiver, with decades long hx of heavy Polysubstance Abuse including using up to 30 bags of heroin IV a day, cocaine (snorting; crack smoking); suspected other substances as well, with hx of withdrawals, hx of methadone use, who presented to the ED with c/o dyspnea, pleuritic left sided non-radiating chest wall pain today.     EXAM: dozing off in bed, awakens to name being called. Still fully clothes and then receives a phone call which he takes. Patient engages in phone conversation for a 1-2 minutes and then hangs up, gets up from bed and says he needs to go. Patient maintains linearity when answering questions with residual underlying irritability and heavy blinking of the eyes seemingly sensitive to light consistent with "coming off phase" of cocaine. Patient at this time denies and also does not manifest any heroin withdrawal symptoms. Patient states he used heroin IV and used cocaine shortly prior to admission. Patient at this time endorses stable baseline mood. He denies and does not manifest any symptoms of hypomania/jose manuel/psychosis/major depression/ anxiety/panic. Denies any active or passive suicidal or homicidal ideation. Names protective factors (emperatriz; family - he is close with his sister; hope for future). Denies access to guns. Able to safety contract and says he will return to ED/go to nearest ED/call 911 should he feel worst and is aware of his health risks.     ISTOP Reference #:[id number] no record found  CVM no record found

## 2021-03-29 NOTE — ED PROVIDER NOTE - ST/T WAVE
qtc 459, concave espinoza v1-3, , twi I, avl, v4-6, + LVH qtc 459, concave 0.5mm espinoza v1-3, , twi I, avl, v4-6, + LVH

## 2021-03-29 NOTE — BEHAVIORAL HEALTH ASSESSMENT NOTE - RELATEDNESS
[facility] ENCOUNTER  INTERNAL MEDICINE OFFICE VISIT      CHIEF COMPLAINT:    Ashia Dutton is a 60 year old female who presents with C/O Pre-Op Exam (H&P / 12.16.20 LAPAROSCOPIC CHOLECYSTECTOMY / DR CROUCH) and Results (DISCUSS CT DONE ON 11/24/20)      MET ESTIMATE  Estimate is at 7 (Can exercise on the treadmill for 30minutes at incline)    ROSIE  No hx of significant snoring .   Stop Bang score is 1    ANESTHESIA  Hx of significant post operative nausea and vomiting. (after exp lap for infertility)  No family hx of Malignant hyperthermia    ANTICOAGULATION   No chronic AC     No chest pain or heaviness. No syncope occasionally dizziness. No frequent PVC. No melena or hematochezia. No hematuria or dysuria.     Patient Active Problem List   Diagnosis   • Frequent PVCs   • Squamous cell skin cancer   • Gastroesophageal reflux disease without esophagitis   • Adenomatous polyp of colon       Past Medical History:   Diagnosis Date   • Gastroesophageal reflux disease        Family History   Problem Relation Age of Onset   • Multiple Sclerosis Mother    • Heart disease Father    • Clotting Disorder Father    • Clotting Disorder Sister    • Clotting Disorder Brother    • Mental retardation Sister    • Clotting Disorder Niece    • Clotting Disorder Niece        Current Outpatient Medications   Medication Sig   • cholecalciferol (Vitamin D-1000 Max St) 25 mcg (1,000 units) tablet Take 2,000 Units by mouth daily.    • ibuprofen (Advil) 200 MG tablet Take 200 mg by mouth as needed.   • MAGNESIUM PO Take 500 mg by mouth daily.   • Lactobacillus Rhamnosus, GG, (Culturelle) Cap Take 1 capsule by mouth daily.   • Multiple Vitamin (vitamin - therapeutic multivitamin) capsule MULTIVITAMINS CAPS   • Alum Hydroxide-Mag Carbonate  MG/15ML Suspension Take by mouth as needed. GAVISCON   • Red Yeast Rice Extract (RED YEAST RICE PO) Take 600 mg by mouth daily.   • Biotin 04869 MCG TABLET DISPERSIBLE    • DISPENSE Take 1,000 mg  by mouth daily. PROPIONYL L-CARITINE   • Coenzyme Q10 (Co Q 10) 100 MG Cap    • Menatetrenone (Vitamin K2) 100 MCG Tab Take 100 mg by mouth daily.   • Ascorbic Acid (VITAMIN C PO)    • ELDERBERRY PO    • ibuprofen (MOTRIN) 200 MG tablet Take 200 mg by mouth every 6 hours as needed for Pain.   • oxyCODONE-acetaminophen (PERCOCET) 5-325 MG per tablet Take 1-2 tablets by mouth every 6 hours as needed for Pain.   • ondansetron (Zofran ODT) 4 MG disintegrating tablet Place 1 tablet onto the tongue every 8 hours as needed for Nausea.   • omeprazole (PrilOSEC) 20 MG capsule Take 20 mg by mouth daily.   • metoPROLOL succinate (TOPROL-XL) 25 MG 24 hr tablet Take 25 mg by mouth daily.    • estradiol (VAGIFEM) 10 MCG vaginal tablet      No current facility-administered medications for this visit.        ALLERGIES:  No Known Allergies      PAST HISTORIES:  I have reviewed the patient's medications and allergies, past medical, surgical, social and family history, updating these as appropriate.  See Histories section of the electronic medical record for a display of this information.      REVIEW OF SYSTEMS:     Constitutional:  No fevers or chills.  No fatigue.  No unexplained weight loss or gain.No unsusual bleeding    Respiratory:  No shortness of breath.  No cough.  No wheezing.  Cardiovascular:  No chest pain.  No palpitations.  No edema.  No syncope.  No paroxysmal nocturnal dyspnea.  Gastrointestinal:  No abdominal pain.  No nausea.  No vomiting.  No diarrhea.  No constipation.  No blood in stool.  No GERD symptoms.  Neurologic:  No headache, dizziness or syncope.  Extremities:  No swelling.  Musculoskeletal:  No significant joint pain or swelling.  Psychiatric:  No depression or anxiety.      PHYSICAL EXAM:    Vital Signs:    Vitals:    11/30/20 1023   BP: 110/80   Pulse: 88   Resp: 16   Temp: 97.7 °F (36.5 °C)   TempSrc: Tympanic   SpO2: 96%   Weight: 100.7 kg   Height: 5' 3\" (1.6 m)        Wt Readings from Last 3  Encounters:   11/30/20 100.7 kg   11/14/20 101.6 kg   11/09/20 101.6 kg       HEENT:  .  Sclerae non-icteric.  Pupils equal.  No sinus tenderness.    Neck:  Supple.  No anterior, posterior or supraclavicular lymphadenopathy.  No thyroid masses or tenderness.  Respiratory:  Clear to auscultation bilaterally.  Normal inspiratory effort.  No retraction or accessory muscle use.  Cardiovascular:  Regular rate and rhythm.  Normal S1, S2.  No S3, S4.  No murmur or rub.  Dorsalis pedis and posterior tibial pulses are palpable.  No carotid bruits.  Abdominal:  +bowel sounds.  No costovertebral angle tenderness.  No hepatosplenomegaly.  No distention and no tenderness in either the upper or lower abdomen.  There is no rebound or guarding.  No masses or hernias are present.   Extremities:  No pallor.  No cyanosis.  No edema noted in right or left anterior tibial/lower extremity.  Neurologic:  Alert, oriented, gait normal, no focal motor weakness of the upper or lower extremities.   Psych:  Pleasant and cooperative with normal affect.  Skin:  No rashes.       LAB RESULTS:  Pertinent labs reviewed.      ASSESSMENT:   No diagnosis found.      PLAN:     Preoperative evaluation   -I feel pt is low risk for proposed low risk procedure.  -I do not feel that any further cardiovascular workup is required.   -No nsaids or asa , no supplements effective now    Hyperglycemia  -No clinical sx of diabetes   -Blood sugar 119  -Will recheck blood sugar and hgba1c in next several months  -Counseled on carb restriction     Renal cysts  -noted on US  -Generous in size  -Will have her see urology (not urgent)       Strong family hx of clotting   -Thrombosis panel being reviewed with hematology          Orders Placed This Encounter   • cholecalciferol (Vitamin D-1000 Max St) 25 mcg (1,000 units) tablet   • ibuprofen (Advil) 200 MG tablet   • MAGNESIUM PO   • Lactobacillus Rhamnosus, GG, (Culturelle) Cap   • Multiple Vitamin (vitamin - therapeutic  multivitamin) capsule   • Alum Hydroxide-Mag Carbonate  MG/15ML Suspension   • Red Yeast Rice Extract (RED YEAST RICE PO)   • Biotin 24873 MCG TABLET DISPERSIBLE   • DISPENSE   • Coenzyme Q10 (Co Q 10) 100 MG Cap   • Menatetrenone (Vitamin K2) 100 MCG Tab   • Ascorbic Acid (VITAMIN C PO)   • ELDERBERRY PO   • ibuprofen (MOTRIN) 200 MG tablet       No follow-ups on file.    Pt voiced understanding and agreement with the plan of care.  Provider wore mask, face shield throughout entire visit. Gloves with examination        Fair

## 2021-03-29 NOTE — BEHAVIORAL HEALTH ASSESSMENT NOTE - NSBHCHARTREVIEWVS_PSY_A_CORE FT
T(C): 36.6 (03-29-21 @ 10:53), Max: 36.6 (03-29-21 @ 07:52)  HR: 97 (03-29-21 @ 12:58) (75 - 100)  BP: 141/96 (03-29-21 @ 12:58) (134/85 - 159/103)  RR: 25 (03-29-21 @ 12:58) (16 - 25)  SpO2: 96% (03-29-21 @ 12:58) (95% - 99%)

## 2021-03-29 NOTE — BEHAVIORAL HEALTH ASSESSMENT NOTE - OTHER
"fine" frail, ashen dry skin, eyes dull n/a see HPI slightly irritable but otherwise euthymic adequate

## 2021-03-29 NOTE — H&P ADULT - NSICDXFAMILYHX_GEN_ALL_CORE_FT
FAMILY HISTORY:  Father  Still living? Unknown  Family history of hypertension, Age at diagnosis: Age Unknown    Mother  Still living? Unknown  Family history of diabetes mellitus, Age at diagnosis: Age Unknown

## 2021-03-29 NOTE — H&P ADULT - NSICDXPASTMEDICALHX_GEN_ALL_CORE_FT
PAST MEDICAL HISTORY:  DM (diabetes mellitus)     HTN (hypertension)     HTN (hypertension)     Opiate abuse, continuous

## 2021-03-29 NOTE — BEHAVIORAL HEALTH ASSESSMENT NOTE - RISK ASSESSMENT
Chronic risk factors: male gender, single. decades of heavy polysubstance abuse. Protective factors: age < 65 yrs; denies formal psychiatric history; denies suicide attempts; denies access to guns; has family support; access to health services. No acute risk factors identified Low Acute Suicide Risk

## 2021-03-29 NOTE — BEHAVIORAL HEALTH ASSESSMENT NOTE - NSBHCHARTREVIEWIMAGING_PSY_A_CORE FT
CT Angio Chest w/ IV Cont (03.29.21) Negative for pulmonary embolism. Findings of pulmonary fibrosis and pulmonary edema. Small bilateral pleural effusions.

## 2021-04-05 DIAGNOSIS — J81.1 CHRONIC PULMONARY EDEMA: ICD-10-CM

## 2021-04-05 DIAGNOSIS — I50.31 ACUTE DIASTOLIC (CONGESTIVE) HEART FAILURE: ICD-10-CM

## 2021-04-05 DIAGNOSIS — Z88.0 ALLERGY STATUS TO PENICILLIN: ICD-10-CM

## 2021-04-05 DIAGNOSIS — F14.20 COCAINE DEPENDENCE, UNCOMPLICATED: ICD-10-CM

## 2021-04-05 DIAGNOSIS — F11.20 OPIOID DEPENDENCE, UNCOMPLICATED: ICD-10-CM

## 2021-04-05 DIAGNOSIS — E78.5 HYPERLIPIDEMIA, UNSPECIFIED: ICD-10-CM

## 2021-04-05 DIAGNOSIS — I11.0 HYPERTENSIVE HEART DISEASE WITH HEART FAILURE: ICD-10-CM

## 2021-04-05 DIAGNOSIS — E11.9 TYPE 2 DIABETES MELLITUS WITHOUT COMPLICATIONS: ICD-10-CM

## 2021-09-30 ENCOUNTER — APPOINTMENT (OUTPATIENT)
Dept: UROLOGY | Facility: CLINIC | Age: 66
End: 2021-09-30
Payer: MEDICAID

## 2021-09-30 VITALS
DIASTOLIC BLOOD PRESSURE: 83 MMHG | WEIGHT: 165 LBS | HEART RATE: 90 BPM | HEIGHT: 71 IN | SYSTOLIC BLOOD PRESSURE: 128 MMHG | BODY MASS INDEX: 23.1 KG/M2 | TEMPERATURE: 98 F

## 2021-09-30 DIAGNOSIS — Z86.39 PERSONAL HISTORY OF OTHER ENDOCRINE, NUTRITIONAL AND METABOLIC DISEASE: ICD-10-CM

## 2021-09-30 DIAGNOSIS — Z80.9 FAMILY HISTORY OF MALIGNANT NEOPLASM, UNSPECIFIED: ICD-10-CM

## 2021-09-30 DIAGNOSIS — Z83.3 FAMILY HISTORY OF DIABETES MELLITUS: ICD-10-CM

## 2021-09-30 DIAGNOSIS — Z82.5 FAMILY HISTORY OF ASTHMA AND OTHER CHRONIC LOWER RESPIRATORY DISEASES: ICD-10-CM

## 2021-09-30 LAB
PSA FREE FLD-MCNC: 31 %
PSA FREE SERPL-MCNC: 0.77 NG/ML
PSA SERPL-MCNC: 2.5 NG/ML

## 2021-09-30 PROCEDURE — 99204 OFFICE O/P NEW MOD 45 MIN: CPT | Mod: 25

## 2021-09-30 PROCEDURE — 51798 US URINE CAPACITY MEASURE: CPT

## 2021-09-30 RX ORDER — ATORVASTATIN CALCIUM 10 MG/1
10 TABLET, FILM COATED ORAL
Refills: 0 | Status: ACTIVE | COMMUNITY

## 2021-09-30 RX ORDER — METFORMIN HYDROCHLORIDE 625 MG/1
TABLET ORAL
Refills: 0 | Status: ACTIVE | COMMUNITY

## 2021-10-01 LAB
APPEARANCE: CLEAR
BACTERIA: NEGATIVE
BILIRUBIN URINE: NEGATIVE
BLOOD URINE: NEGATIVE
COLOR: NORMAL
GLUCOSE QUALITATIVE U: NEGATIVE
HYALINE CASTS: 0 /LPF
KETONES URINE: NEGATIVE
LEUKOCYTE ESTERASE URINE: NEGATIVE
MICROSCOPIC-UA: NORMAL
NITRITE URINE: NEGATIVE
PH URINE: 6
PROTEIN URINE: NORMAL
RED BLOOD CELLS URINE: 1 /HPF
SPECIFIC GRAVITY URINE: 1.02
SQUAMOUS EPITHELIAL CELLS: 1 /HPF
UROBILINOGEN URINE: NORMAL
WHITE BLOOD CELLS URINE: 1 /HPF

## 2021-10-01 NOTE — LETTER BODY
[FreeTextEntry1] : Taylor Horvath MD\par [address] [facility], [place], operated [facility]\[place] [address]\par \par Dear Dr. Horvath,\par \par Marc Macias presents to the office today.  As you know, he is a 66-year-old man referred for evaluation of lower urinary tract symptoms.  He reports at least 6 months of having urinary bother.  He reports a very weak urinary stream, dribbling at times and feelings of incomplete bladder emptying.  He has a take some several minutes to urinate and he has nocturia 4-5 times each night.  He has never had treatment for the symptoms.  He denies prior episodes of urinary retention or infection.  There has been no catheterization of the bladder.  He denies prior history of pelvic radiation or prostate surgery. \par \par The patient reports no cancer history in his family and specifically no history of prostate cancer.  He is unaware of any prior PSA levels but has never been told of a PSA elevation.  He has never required a biopsy of his prostate.\par \par I performed a post void residual bladder scan today showing a volume of 104 mL.  He is clearly retaining some urine and this may actually be worse at night.  His symptoms are obstructive primarily and do suggest bladder outlet obstruction most likely related to BPH.  I would recommend that we start treatment with medical management.  I will prescribe him an alpha-blocker, tamsulosin 0.4 mg daily.  I will see him back to reassess the symptoms in about a month and we will determine at that point if there is any other treatment to be considered.  We discussed potentially increasing dosage, adding a 5 alpha reductase inhibitor, and we also reviewed the potential future need for prostate surgery to correct bladder outlet obstruction depending on his response to medical management.\par \par I checked a PSA level today.  It is 2.5 ng/mL which is normal for his age and I do not have any concerns regarding further cancer screening at this time with either imaging or biopsy.\par \par Please do not hesitate to contact me with any questions or concerns and thank you very much for the kind referral.\par \par Sincerely,\par \par \par \par \par Enoch Henderson MD, FACS\par  of Urology\par Residency \par [facility]\par \par [facility]\par Director of Robotics and Minimally Invasive Surgery\par [address] [place] [address]\[place] [address]\par P: [phone]\par F: [phone]\par [facility]

## 2021-10-03 LAB — BACTERIA UR CULT: NORMAL

## 2021-10-22 ENCOUNTER — APPOINTMENT (OUTPATIENT)
Dept: UROLOGY | Facility: CLINIC | Age: 66
End: 2021-10-22
Payer: MEDICAID

## 2021-10-22 PROCEDURE — 99213 OFFICE O/P EST LOW 20 MIN: CPT | Mod: 25

## 2021-10-22 PROCEDURE — 51798 US URINE CAPACITY MEASURE: CPT

## 2022-02-15 ENCOUNTER — NON-APPOINTMENT (OUTPATIENT)
Age: 67
End: 2022-02-15

## 2022-02-22 ENCOUNTER — APPOINTMENT (OUTPATIENT)
Dept: UROLOGY | Facility: CLINIC | Age: 67
End: 2022-02-22
Payer: MEDICAID

## 2022-02-22 PROCEDURE — 99213 OFFICE O/P EST LOW 20 MIN: CPT

## 2022-02-23 NOTE — LETTER BODY
[FreeTextEntry1] : Taylor Horvath MD\par [address] [facility]\[place] [address]\par \par Dear Dr. Horvath,\par \par Marc Macias returns to the office today.  He is a 66-year-old man here today in follow-up regarding BPH and associated lower urinary tract symptoms.  I met him last year in September and I had started him on combination therapy with tamsulosin and finasteride.  We had started first with the tamsulosin and added finasteride about a month later.  He has now been on that medication for about 4 months.  He had seen some signs of improvement with a bit more forceful urinary stream but he is still having symptoms of nocturia x3-4, daytime frequency, some elements of urinary hesitancy although not always.  He also has symptoms of incomplete emptying at times.  He denies hematuria or dysuria.  He does not strain when he urinates, but he sometimes does develop a sensation of pressure in his suprapubic region just before urinating.\par \par I reviewed with the patient today that there are additional options to address his urinary symptoms.  He has both obstructive and irritative components to his symptoms and the root cause of these is most likely related to BPH and bladder outlet obstruction.  I would like to give the finasteride a bit more time to see if it will help him to further improve before making any other treatment recommendations.  I think the next 2 to 3 months should be adequate to know if he will have sufficient benefit.  If he does not see more significant improvement, I would consider surgical intervention here such as a transurethral resection of the prostate or other bladder outlet surgery.\par \par I reviewed with him what would be involved with endoscopic related treatment of bladder outlet obstruction either with transurethral resection, laser enucleation, as well as other modalities to address the outlet obstruction.  At this point, I do not think he needs to yet consider this but this will be an option discussed with him again at his next follow-up in a few months.\par \par His PSA level collected at our first visit was 2.5, within normal limits for his age and I do not think he needs to consider any additional prostate cancer screening work-up such as either imaging or biopsy.\par \par Please do not hesitate to contact me with any questions or concerns.  I will update you again after our next visit.\par \par Sincerely,\par \par \par \par \par Enoch Henderson MD, FACS\par  of Urology\par Residency \par [facility]\par \par [facility]\par Director of Robotics and Minimally Invasive Surgery\par [address] [place] [address]\[place] [address]\par P: [phone]\par F: [phone]\par [facility]\par

## 2022-05-12 NOTE — ED ADULT NURSE NOTE - CAS TRG GEN SKIN CONDITION
Warm/Dry Complex Repair And M Plasty Text: The defect edges were debeveled with a #15 scalpel blade.  The primary defect was closed partially with a complex linear closure.  Given the location of the remaining defect, shape of the defect and the proximity to free margins an M plasty was deemed most appropriate for complete closure of the defect.  Using a sterile surgical marker, an appropriate advancement flap was drawn incorporating the defect and placing the expected incisions within the relaxed skin tension lines where possible.    The area thus outlined was incised deep to adipose tissue with a #15 scalpel blade.  The skin margins were undermined to an appropriate distance in all directions utilizing iris scissors.

## 2022-07-12 ENCOUNTER — APPOINTMENT (OUTPATIENT)
Dept: THORACIC SURGERY | Facility: CLINIC | Age: 67
End: 2022-07-12
Payer: MEDICARE

## 2022-07-19 ENCOUNTER — APPOINTMENT (OUTPATIENT)
Dept: THORACIC SURGERY | Facility: CLINIC | Age: 67
End: 2022-07-19
Payer: MEDICARE

## 2022-07-19 VITALS
OXYGEN SATURATION: 97 % | DIASTOLIC BLOOD PRESSURE: 88 MMHG | SYSTOLIC BLOOD PRESSURE: 133 MMHG | HEART RATE: 86 BPM | BODY MASS INDEX: 26.2 KG/M2 | WEIGHT: 183 LBS | RESPIRATION RATE: 16 BRPM | HEIGHT: 70 IN

## 2022-07-19 DIAGNOSIS — F17.200 NICOTINE DEPENDENCE, UNSPECIFIED, UNCOMPLICATED: ICD-10-CM

## 2022-07-19 DIAGNOSIS — R91.1 SOLITARY PULMONARY NODULE: ICD-10-CM

## 2022-07-19 DIAGNOSIS — Z82.49 FAMILY HISTORY OF ISCHEMIC HEART DISEASE AND OTHER DISEASES OF THE CIRCULATORY SYSTEM: ICD-10-CM

## 2022-07-19 DIAGNOSIS — Z83.438 FAMILY HISTORY OF OTHER DISORDER OF LIPOPROTEIN METABOLISM AND OTHER LIPIDEMIA: ICD-10-CM

## 2022-07-19 DIAGNOSIS — Z86.39 PERSONAL HISTORY OF OTHER ENDOCRINE, NUTRITIONAL AND METABOLIC DISEASE: ICD-10-CM

## 2022-07-19 DIAGNOSIS — Z86.79 PERSONAL HISTORY OF OTHER DISEASES OF THE CIRCULATORY SYSTEM: ICD-10-CM

## 2022-07-19 PROCEDURE — 99205 OFFICE O/P NEW HI 60 MIN: CPT

## 2022-07-20 DIAGNOSIS — Z01.818 ENCOUNTER FOR OTHER PREPROCEDURAL EXAMINATION: ICD-10-CM

## 2022-07-20 PROBLEM — F17.200 CURRENT SMOKER: Status: ACTIVE | Noted: 2022-07-20

## 2022-07-20 PROBLEM — Z86.39 HISTORY OF HYPERLIPIDEMIA: Status: RESOLVED | Noted: 2022-07-20 | Resolved: 2022-07-20

## 2022-07-20 PROBLEM — Z82.49 FAMILY HISTORY OF CARDIAC DISORDER: Status: ACTIVE | Noted: 2022-07-20

## 2022-07-20 PROBLEM — R91.1 LUNG NODULE: Status: ACTIVE | Noted: 2022-07-08

## 2022-07-20 PROBLEM — Z86.79 HISTORY OF HYPERTENSION: Status: RESOLVED | Noted: 2022-07-20 | Resolved: 2022-07-20

## 2022-07-20 PROBLEM — Z83.438 FAMILY HISTORY OF HYPERLIPIDEMIA: Status: ACTIVE | Noted: 2022-07-20

## 2022-07-20 NOTE — CONSULT LETTER
[FreeTextEntry2] : Dr. Benja Carl (Pulm/Ref) [FreeTextEntry3] : Anthony Randhawa MD, FACS \par Chief, Division of Thoracic Surgery \par Director, Minimally Invasive Thoracic Surgery \par Department of Cardiovascular and Thoracic Surgery \par [facility] \par , Cardiovascular and Thoracic Surgery\par

## 2022-07-20 NOTE — PHYSICAL EXAM
[Restricted in physically strenuous activity but ambulatory and able to carry out work of a light or sedentary nature] : Status 1- Restricted in physically strenuous activity but ambulatory and able to carry out work of a light or sedentary nature, e.g., light house work, office work [General Appearance - Alert] : alert [General Appearance - In No Acute Distress] : in no acute distress [General Appearance - Well Nourished] : well nourished [Sclera] : the sclera and conjunctiva were normal [PERRL With Normal Accommodation] : pupils were equal in size, round, and reactive to light [Extraocular Movements] : extraocular movements were intact [Outer Ear] : the ears and nose were normal in appearance [Hearing Threshold Finger Rub Not [place]] : hearing was normal [Both Tympanic Membranes Were Examined] : both tympanic membranes were normal [Neck Appearance] : the appearance of the neck was normal [Neck Cervical Mass (___cm)] : no neck mass was observed [Jugular Venous Distention Increased] : there was no jugular-venous distention [] : no respiratory distress [Respiration, Rhythm And Depth] : normal respiratory rhythm and effort [Exaggerated Use Of Accessory Muscles For Inspiration] : no accessory muscle use [Auscultation Breath Sounds / Voice Sounds] : lungs were clear to auscultation bilaterally [Heart Rate And Rhythm] : heart rate was normal and rhythm regular [Examination Of The Chest] : the chest was normal in appearance [Chest Visual Inspection Thoracic Asymmetry] : no chest asymmetry [Diminished Respiratory Excursion] : normal chest expansion [2+] : left 2+ [Breast Appearance] : normal in appearance [Breast Palpation Mass] : no palpable masses [Bowel Sounds] : normal bowel sounds [Abdomen Soft] : soft [Abdomen Tenderness] : non-tender [Cervical Lymph Nodes Enlarged Posterior Bilaterally] : posterior cervical [Cervical Lymph Nodes Enlarged Anterior Bilaterally] : anterior cervical [Supraclavicular Lymph Nodes Enlarged Bilaterally] : supraclavicular [No CVA Tenderness] : no ~M costovertebral angle tenderness [No Spinal Tenderness] : no spinal tenderness [Abnormal Walk] : normal gait [Nail Clubbing] : no clubbing  or cyanosis of the fingernails [Involuntary Movements] : no involuntary movements were seen [Musculoskeletal - Swelling] : no joint swelling seen [Skin Color & Pigmentation] : normal skin color and pigmentation [No Focal Deficits] : no focal deficits [Oriented To Time, Place, And Person] : oriented to person, place, and time [FreeTextEntry1] : Deferred

## 2022-07-20 NOTE — ASSESSMENT
[FreeTextEntry1] : Mr. JADEN MORRELL, 66 year old male, current smoker (1/2 PPD x 30 years) former  w/ hx of DM, HLD, HTN, BPH, who was referred by Dr. Benja Carl (Pulm) for enlarging CHALINO nodule. \par \par I have reviewed the patient's medical records and diagnostic images at time of this office consultation and have made the following recommendation:\par 1. Discussed Flex bronch, Left VATS, Lung resection for definitive tissue diagnosis. Risks, benefits and alternatives explained to patient; All questions answered and patient agrees to proceed with surgery. \par 2. Cardiac clearance with Dr. Arabella Kennedy, and PFTs required prior to procedure. \par \par Recommendations reviewed with patient during this office visit, and all questions answered; Patient instructed on the importance of follow up and verbalizes understanding.\par \par I personally performed the services described in the documentation, reviewed the documentation recorded by the scribe in my presence and it accurately and completely records my words and actions.\par \par I, Brianne Gordon ANP-C, am scribing for and the presence of LEONILA Stewart, the following sections HISTORY OF PRESENT ILLNESS, PAST MEDICAL/FAMILY/SOCIAL HISTORY; REVIEW OF SYSTEMS; VITAL SIGNS; PHYSICAL EXAM; DISPOSITION.\par \par \par \par

## 2022-07-20 NOTE — HISTORY OF PRESENT ILLNESS
[FreeTextEntry1] : Mr. JADEN MORRELL, 66 year old male, current smoker (1/2 PPD x 30 years) former  w/ hx of DM, HLD, HTN, BPH, who was referred by Dr. Benja Carl (Pulm) for enlarging CHALINO nodule. \par \par CT chest on 02/14/2022:\par - There is newly appreciated left anterior medial upper lobe nodule measuring 1.6 x 1.9 cm. This intimately abuts the anterior mediastinum.\par - Emphysematous changes. Interstitial lung disease with fibrosis. They show no significant change going back to 2019.\par \par PET/CT on 03/14/2022:\par - There is hypermetabolic nodule in the anteromedial left upper lobe of the lung, which measures 1.9 x 1.6 cm, image 111. The standard uptake values of this activity range from 5.4-6.7. \par - Emphysematous changes of the lungs with imaging evidence consistent with pulmonary fibrosis.\par - Cholelithiasis.\par -  Small umbilical hernia containing fat but not bowel.\par -  Enlarged prostate. \par \par CT chest on 05/05/2022: \par - 2.4 x 1.7 x 2.5 cm anterior left upper lobe medial paramediastinal mass. Lesion slightly larger then 2/14/2022 when measured 2.1 x 1.6 x 2.6 cm using same landmarks. Lesion not present in 2020. Lesion demonstrated increased FDG uptake on PET CT scan 3/14/2022.\par - Pulmonary emphysema and subpleural cysts worse upper lungs.\par - Peripheral reticular fibrosis and possible honeycomb changes upper, mid and lower lungs, difficult to distinguish from small bulla/blebs. These fibrotic changes unchanged from 2/14/2022.\par \par Patient is here today for CT surgery consultation. Today, patient denies worsening SOB, chest pain, cough, hemoptysis, fever, chills, night sweats, lightheadedness or dizziness.\par

## 2022-07-26 NOTE — CONSULT NOTE ADULT - SUBJECTIVE AND OBJECTIVE BOX
74y Male with history of HTN on HCTZ presents with weakness found to have COVID-19. Nephrology consulted for hyponatremia.    1) ZELALEM: likely ATN secondary to sepsis and vanco toxicity. UO remains poor.  Would not offer dialysis as it would not change overall prognosis. Tried to call family in presence of ICU team ON 4/17, but unable to reach via phone and voicemail was full.  Avoid nephrotoxins.     2) HTN: NOW with hypotension BACK ON pressors at the moment. As per ICU. Monitor BP.    3) Metabolic acidosis: Was acidotic, now alkalotic today.  D/c sodium bicarbonate tablets.  (Off IV sodium bicarbonate since yesterday). Now with concurrent respiratory alkalosis.  There is still a component of metabolic acidosis from lactate and renal failure, but given pH>7.4, will keep off bicarbonate tablets for now.     4) Hyperkalemia: Would change diet to nepro if diet resumed. Can medically manage as needed. Monitor serum K.    5) COVID-19: As per primary team.      GRAVE PROGNOSIS. INVASIVE MEASURES WOULD BE FUTILE.    [facility] NEPHROLOGY  Mahesh Lomax M.D.  Perez Armstrong D.O.  Gricelda Choe M.D.  Juliet Rogers, MSN, ANP-C    Telephone: [phone]  Facsimile: [phone]    [id number] [id number] DATE OF SERVICE:  07/26/2022  Patient was seen,examined and evaluated  by me.    CHIEF COMPLAINT:Lung Nodule    HPI:      PAST MEDICAL & SURGICAL HISTORY:  HTN (hypertension)      HTN (hypertension)      DM (diabetes mellitus)      Opiate abuse, continuous      No significant past surgical history          MEDICATIONS  (STANDING):    MEDICATIONS  (PRN):      FAMILY HISTORY:  Family history of diabetes mellitus (Mother)    Family history of hypertension (Father)      No family history of premature coronary artery disease or sudden cardiac death    SOCIAL HISTORY:  Smoking-[ ] Active  [ ] Former [ ] Non Smoker  Alcohol-[ ] Denies [ ] Social [ ] Daily  Ilicit Drug use-[ ] Denies [ ] Active user    REVIEW OF SYSTEMS:  Constitutional: [ ] fever, [ ]weight loss, [ ]fatigue   Activity [ ] Bedbound,[ ] Ambulates [ ] Unassisted[ ] Cane/Walker [ ] Assistence.  Effort tolerance:[ ] Excellent [ ] Good [ ] Fair [ ] Poor [ ]  Eyes: [ ] visual changes  Respiratory: [ ]shortness of breath;  [ ] cough, [ ]wheezing, [ ]chills, [ ]hemoptysis  Cardiovascular: [ ] chest pain, [ ]palpitations, [ ]dizziness,  [ ]leg swelling[ ]orthopnea [ ]PND  Gastrointestinal: [ ] abdominal pain, [ ]nausea, [ ]vomiting,  [ ]diarrhea,[ ]constipation  Genitourinary: [ ] dysuria, [ ] hematuria  Neurologic: [ ] headaches [ ] tremors[ ] weakness  Skin: [ ] itching, [ ]burning, [ ] rashes  Endocrine: [ ] heat or cold intolerance  Musculoskeletal: [ ] joint pain or swelling; [ ] muscle, back, or extremity pain  Psychiatric: [ ] depression, [ ]anxiety, [ ]mood swings, or [ ]difficulty sleeping  Hematologic: [ ] easy bruising, [ ] bleeding gums       [ x] All others negative	  [ ] Unable to obtain    Vital Signs Last 24 Hrs  T(C): --  T(F): --  HR: --  BP: --  BP(mean): --  RR: --  SpO2: --      I&O's Summary      PHYSICAL EXAM:  General: No acute distress BMI-  HEENT: EOMI, PERRL[ ] Icteric  Neck: Supple, No JVD  Lungs: Equal air entry bilaterally; [ ] Rales [ ] Rhonchi [ ] Wheezing  Heart: Regular rate and rhythm;[ ] Murmurs-   /6 [ ] Systolic [ ] Diastolic [ ] Radiation,No rubs, or gallops  Abdomen: Nontender, bowel sounds present  Extremities: No clubbing, cyanosis, or edema[ ] Calf tenderness  Nervous system:  Alert & Oriented X3, no focal deficits  Psychiatric: Normal affect  Skin: No rashes or lesions      LABS:        Creatinine Trend:         Lipid Panel:   Cardiac Enzymes:           RADIOLOGY:    ECG [my interpretation]:    TELEMETRY:    ECHO:    STRESS TEST:    CATHETERIZATION: DATE OF SERVICE:  07/26/2022  Patient was seen,examined and evaluated  by me.  CHIEF COMPLAINT:Lung Nodule  HPI:65y Male with a known h/o DM 2 (on OHG agents), HTN, HLD, Hx  drug  abuser (heroin and cocaine ) quit 07/2021 noted lung nodule scheduled for  flexible bronchoscopy, left VATs, lung resection 8/5/22.   PAST MEDICAL & SURGICAL HISTORY: HTN (hypertension)   HTN (hypertension)   DM (diabetes mellitus)   Opiate abuse, continuous   No significant past surgical history     MEDICATIONS  (STANDING): · 	enalapril 20 mg oral tablet: 1 tab(s) orally once a day · 	atorvastatin 10 mg oral tablet: 1 tab(s) orally once a day · 	ferrous sulfate 325 mg (65 mg elemental iron) oral tablet: 1 tab(s) orally once a day · 	Vitamin B12 1000 mcg oral tablet: 1 tab(s) orally once a day · 	finasteride 5 mg oral tablet: 1 tab(s) orally once a day · 	metFORMIN 500 mg oral tablet: 1 tab(s) orally once a day · 	tamsulosin 0.4 mg oral capsule: 1 cap(s) orally once a day   FAMILY HISTORY: Family history of diabetes mellitus (Mother)  Family history of hypertension (Father)   No family history of premature coronary artery disease or sudden cardiac death  SOCIAL HISTORY: Smoking-[x ] Active  [ ] Former [ ] Non Smoker Alcohol-[ ] Denies [x ] Social [ ] Daily Ilicit Drug use-[ ] Denies [ ] Active user[x] quit cocaine 07/2021  REVIEW OF SYSTEMS: Constitutional: [ ] fever, [ ]weight loss, [ ]fatigue  Activity [ ] Bedbound,[x ] Ambulates [x ] Unassisted[ ] Cane/Walker [ ] Assistence. Effort tolerance:[ ] Excellent [ ] Good [ ] Fair [x ] Poor [ ] Eyes: [ ] visual changes Respiratory: [ ]shortness of breath;  [x ] cough, [ ]wheezing, [ ]chills, [ ]hemoptysis Cardiovascular: [ ] chest pain, [ ]palpitations, [ ]dizziness,  [ ]leg swelling[ ]orthopnea [ ]PND Gastrointestinal: [ ] abdominal pain, [ ]nausea, [ ]vomiting,  [ ]diarrhea,[ ]constipation Genitourinary: [ ] dysuria, [ ] hematuria Neurologic: [ ] headaches [ ] tremors[ ] weakness Skin: [ ] itching, [ ]burning, [ ] rashes Endocrine: [ ] heat or cold intolerance Musculoskeletal: [ ] joint pain or swelling; [ ] muscle, back, or extremity pain Psychiatric: [ ] depression, [ ]anxiety, [ ]mood swings, or [ ]difficulty sleeping Hematologic: [ ] easy bruising, [ ] bleeding gums    [ x] All others negative	 [ ] Unable to obtain  Vital Signs Last 24 Hrs · Temp (F)	97.3 Degrees F · Temp (C)	36.3 Degrees C · Heart Rate	66 /min · Respiration Rate (breaths/min)	16 /min · BP Systolic	153 mm Hg · BP Diastolic	  106 mm Hg · Blood Pressure - Method	auscultated w/ stethoscope · BP Noninvasive Mean	121 mm Hg · SpO2 (%)	98 % · O2 Delivery/Oxygen Delivery Method	room air   PHYSICAL EXAM: General: No acute distress BMI-31 HEENT: EOMI, PERRL[ ] Icteric Neck: Supple, No JVD Lungs: Equal air entry bilaterally; [ ] Rales [ ] Rhonchi [ ] Wheezing Heart: Regular rate and rhythm;[x ] Murmurs-  2 /6 [x ] Systolic [ ] Diastolic [ ] Radiation,No rubs, or gallops Abdomen: Nontender, bowel sounds present Extremities: No clubbing, cyanosis, or edema[ ] Calf tenderness Nervous system:  Alert & Oriented X3, no focal deficits Psychiatric: Normal affect Skin: No rashes or lesions   LABS: Comprehensive Metabolic Panel (07.27.22 @ 12:55)  Sodium, Serum: 139 mmol/L  Potassium, Serum: 4.2 mmol/L  Chloride, Serum: 102 mmol/L  Carbon Dioxide, Serum: 24 mmol/L  Anion Gap, Serum: 13 mmol/L  Blood Urea Nitrogen, Serum: 8 mg/dL  Creatinine, Serum: 0.93 mg/dL  Glucose, Serum: 87 mg/dL  Calcium, Total Serum: 9.4 mg/dL  Protein Total, Serum: 8.3 g/dL  Albumin, Serum: 4.1 g/dL  Bilirubin Total, Serum: 0.3 mg/dL  Alkaline Phosphatase, Serum: 84 U/L  Aspartate Aminotransferase (AST/SGOT): 18 U/L  Alanine Aminotransferase (ALT/SGPT): 8 U/L  eGFR: 92  Complete Blood Count (07.27.22 @ 12:55)  Nucleated RBC: 0 /100 WBCs  WBC Count: 9.12 K/uL  RBC Count: 4.16 M/uL  Hemoglobin: 12.4 g/dL  Hematocrit: 40.9 %  Mean Cell Volume: 98.3 fL  Mean Cell Hemoglobin: 29.8 pg  Mean Cell Hemoglobin Conc: 30.3 gm/dL  Red Cell Distrib Width: 13.0 %  Platelet Count - Automated: 280 K/uL   RADIOLOGY: CT ANGIO CHEST (W)AW ICPROCEDURE DATE:  03/29/2021   IMPRESSION: Negative for pulmonary embolism. Findings of pulmonary fibrosis and pulmonary edema. Small bilateral pleural effusions.   ECG [my interpretation]: Normal sinus rhythm Possible Left atrial enlargement Left ventricular hypertrophy T wave abnormality  non specific DATE OF SERVICE:  07/26/2022  Patient was seen,examined and evaluated  by me.  CHIEF COMPLAINT:Lung Nodule  HPI:65y Male with a known h/o DM 2 (on OHG agents), HTN, HLD, Hx  drug  abuser (heroin and cocaine ) quit 07/2021 noted lung nodule scheduled for  flexible bronchoscopy, left VATs, lung resection 8/5/22.Denies chest pain syncope is able to ambulate 3-5 blocks   PAST MEDICAL & SURGICAL HISTORY: HTN (hypertension)   HTN (hypertension)   DM (diabetes mellitus)   Opiate abuse, continuous   No significant past surgical history     MEDICATIONS  (STANDING): · 	enalapril 20 mg oral tablet: 1 tab(s) orally once a day · 	atorvastatin 10 mg oral tablet: 1 tab(s) orally once a day · 	ferrous sulfate 325 mg (65 mg elemental iron) oral tablet: 1 tab(s) orally once a day · 	Vitamin B12 1000 mcg oral tablet: 1 tab(s) orally once a day · 	finasteride 5 mg oral tablet: 1 tab(s) orally once a day · 	metFORMIN 500 mg oral tablet: 1 tab(s) orally once a day · 	tamsulosin 0.4 mg oral capsule: 1 cap(s) orally once a day   FAMILY HISTORY: Family history of diabetes mellitus (Mother)  Family history of hypertension (Father)   No family history of premature coronary artery disease or sudden cardiac death  SOCIAL HISTORY: Smoking-[x ] Active  [ ] Former [ ] Non Smoker Alcohol-[ ] Denies [x ] Social [ ] Daily Ilicit Drug use-[ ] Denies [ ] Active user[x] quit cocaine 07/2021  REVIEW OF SYSTEMS: Constitutional: [ ] fever, [ ]weight loss, [ ]fatigue  Activity [ ] Bedbound,[x ] Ambulates [x ] Unassisted[ ] Cane/Walker [ ] Assistence. Effort tolerance:[ ] Excellent [ ] Good [ ] Fair [x ] Poor [ ] Eyes: [ ] visual changes Respiratory: [ ]shortness of breath;  [x ] cough, [ ]wheezing, [ ]chills, [ ]hemoptysis Cardiovascular: [ ] chest pain, [ ]palpitations, [ ]dizziness,  [ ]leg swelling[ ]orthopnea [ ]PND Gastrointestinal: [ ] abdominal pain, [ ]nausea, [ ]vomiting,  [ ]diarrhea,[ ]constipation Genitourinary: [ ] dysuria, [ ] hematuria Neurologic: [ ] headaches [ ] tremors[ ] weakness Skin: [ ] itching, [ ]burning, [ ] rashes Endocrine: [ ] heat or cold intolerance Musculoskeletal: [ ] joint pain or swelling; [ ] muscle, back, or extremity pain Psychiatric: [ ] depression, [ ]anxiety, [ ]mood swings, or [ ]difficulty sleeping Hematologic: [ ] easy bruising, [ ] bleeding gums    [ x] All others negative	 [ ] Unable to obtain  Vital Signs Last 24 Hrs · Temp (F)	97.3 Degrees F · Temp (C)	36.3 Degrees C · Heart Rate	66 /min · Respiration Rate (breaths/min)	16 /min · BP Systolic	153 mm Hg · BP Diastolic	  106 mm Hg · Blood Pressure - Method	auscultated w/ stethoscope · BP Noninvasive Mean	121 mm Hg · SpO2 (%)	98 % · O2 Delivery/Oxygen Delivery Method	room air   PHYSICAL EXAM: General: No acute distress BMI-31 HEENT: EOMI, PERRL[ ] Icteric Neck: Supple, No JVD Lungs: Equal air entry bilaterally; [ ] Rales [ ] Rhonchi [ ] Wheezing Heart: Regular rate and rhythm;[x ] Murmurs-  2 /6 [x ] Systolic [ ] Diastolic [ ] Radiation,No rubs, or gallops Abdomen: Nontender, bowel sounds present Extremities: No clubbing, cyanosis, or edema[ ] Calf tenderness Nervous system:  Alert & Oriented X3, no focal deficits Psychiatric: Normal affect Skin: No rashes or lesions   LABS: Comprehensive Metabolic Panel (07.27.22 @ 12:55)  Sodium, Serum: 139 mmol/L  Potassium, Serum: 4.2 mmol/L  Chloride, Serum: 102 mmol/L  Carbon Dioxide, Serum: 24 mmol/L  Anion Gap, Serum: 13 mmol/L  Blood Urea Nitrogen, Serum: 8 mg/dL  Creatinine, Serum: 0.93 mg/dL  Glucose, Serum: 87 mg/dL  Calcium, Total Serum: 9.4 mg/dL  Protein Total, Serum: 8.3 g/dL  Albumin, Serum: 4.1 g/dL  Bilirubin Total, Serum: 0.3 mg/dL  Alkaline Phosphatase, Serum: 84 U/L  Aspartate Aminotransferase (AST/SGOT): 18 U/L  Alanine Aminotransferase (ALT/SGPT): 8 U/L  eGFR: 92  Complete Blood Count (07.27.22 @ 12:55)  Nucleated RBC: 0 /100 WBCs  WBC Count: 9.12 K/uL  RBC Count: 4.16 M/uL  Hemoglobin: 12.4 g/dL  Hematocrit: 40.9 %  Mean Cell Volume: 98.3 fL  Mean Cell Hemoglobin: 29.8 pg  Mean Cell Hemoglobin Conc: 30.3 gm/dL  Red Cell Distrib Width: 13.0 %  Platelet Count - Automated: 280 K/uL   RADIOLOGY: CT ANGIO CHEST (W)AW ICPROCEDURE DATE:  03/29/2021   IMPRESSION: Negative for pulmonary embolism. Findings of pulmonary fibrosis and pulmonary edema. Small bilateral pleural effusions.   ECG [my interpretation]: 07/27/2022Normal sinus rhythm Voltage criteria for left ventricular hypertrophy Nonspecific ST and T wave abnormality Abnormal ECG    03/29/2021 Normal sinus rhythm Possible Left atrial enlargement Left ventricular hypertrophy T wave abnormality  non specific  no change on serial tracings

## 2022-07-26 NOTE — CONSULT NOTE ADULT - TIME BILLING
- Review of records, telemetry, vital signs and daily labs.   - General and cardiovascular physical examination.  - Generation of cardiovascular treatment plan.  - Coordination of care.      Patient was seen and examined by me on 07/26/2022,interim events noted,labs and radiology studies reviewed.  Brent Bell MD,FACC.  [address] [place], [place] [id number].  [phone]

## 2022-07-27 ENCOUNTER — OUTPATIENT (OUTPATIENT)
Dept: OUTPATIENT SERVICES | Facility: HOSPITAL | Age: 67
LOS: 1 days | End: 2022-07-27

## 2022-07-27 VITALS
HEIGHT: 71 IN | TEMPERATURE: 97 F | RESPIRATION RATE: 16 BRPM | OXYGEN SATURATION: 98 % | WEIGHT: 186.07 LBS | HEART RATE: 66 BPM | SYSTOLIC BLOOD PRESSURE: 153 MMHG | DIASTOLIC BLOOD PRESSURE: 106 MMHG

## 2022-07-27 DIAGNOSIS — R97.1 ELEVATED CANCER ANTIGEN 125 [CA 125]: ICD-10-CM

## 2022-07-27 DIAGNOSIS — R94.31 ABNORMAL ELECTROCARDIOGRAM [ECG] [EKG]: ICD-10-CM

## 2022-07-27 DIAGNOSIS — E11.9 TYPE 2 DIABETES MELLITUS WITHOUT COMPLICATIONS: ICD-10-CM

## 2022-07-27 DIAGNOSIS — I10 ESSENTIAL (PRIMARY) HYPERTENSION: ICD-10-CM

## 2022-07-27 DIAGNOSIS — R91.8 OTHER NONSPECIFIC ABNORMAL FINDING OF LUNG FIELD: ICD-10-CM

## 2022-07-27 LAB
A1C WITH ESTIMATED AVERAGE GLUCOSE RESULT: 5 % — SIGNIFICANT CHANGE UP (ref 4–5.6)
ALBUMIN SERPL ELPH-MCNC: 4.1 G/DL — SIGNIFICANT CHANGE UP (ref 3.3–5)
ALP SERPL-CCNC: 84 U/L — SIGNIFICANT CHANGE UP (ref 40–120)
ALT FLD-CCNC: 8 U/L — SIGNIFICANT CHANGE UP (ref 4–41)
ANION GAP SERPL CALC-SCNC: 13 MMOL/L — SIGNIFICANT CHANGE UP (ref 7–14)
AST SERPL-CCNC: 18 U/L — SIGNIFICANT CHANGE UP (ref 4–40)
BILIRUB SERPL-MCNC: 0.3 MG/DL — SIGNIFICANT CHANGE UP (ref 0.2–1.2)
BLD GP AB SCN SERPL QL: NEGATIVE — SIGNIFICANT CHANGE UP
BUN SERPL-MCNC: 8 MG/DL — SIGNIFICANT CHANGE UP (ref 7–23)
CALCIUM SERPL-MCNC: 9.4 MG/DL — SIGNIFICANT CHANGE UP (ref 8.4–10.5)
CHLORIDE SERPL-SCNC: 102 MMOL/L — SIGNIFICANT CHANGE UP (ref 98–107)
CO2 SERPL-SCNC: 24 MMOL/L — SIGNIFICANT CHANGE UP (ref 22–31)
CREAT SERPL-MCNC: 0.93 MG/DL — SIGNIFICANT CHANGE UP (ref 0.5–1.3)
EGFR: 92 ML/MIN/1.73M2 — SIGNIFICANT CHANGE UP
ESTIMATED AVERAGE GLUCOSE: 97 — SIGNIFICANT CHANGE UP
GLUCOSE SERPL-MCNC: 87 MG/DL — SIGNIFICANT CHANGE UP (ref 70–99)
HCT VFR BLD CALC: 40.9 % — SIGNIFICANT CHANGE UP (ref 39–50)
HGB BLD-MCNC: 12.4 G/DL — LOW (ref 13–17)
MCHC RBC-ENTMCNC: 29.8 PG — SIGNIFICANT CHANGE UP (ref 27–34)
MCHC RBC-ENTMCNC: 30.3 GM/DL — LOW (ref 32–36)
MCV RBC AUTO: 98.3 FL — SIGNIFICANT CHANGE UP (ref 80–100)
NRBC # BLD: 0 /100 WBCS — SIGNIFICANT CHANGE UP
NRBC # FLD: 0 K/UL — SIGNIFICANT CHANGE UP
PLATELET # BLD AUTO: 280 K/UL — SIGNIFICANT CHANGE UP (ref 150–400)
POTASSIUM SERPL-MCNC: 4.2 MMOL/L — SIGNIFICANT CHANGE UP (ref 3.5–5.3)
POTASSIUM SERPL-SCNC: 4.2 MMOL/L — SIGNIFICANT CHANGE UP (ref 3.5–5.3)
PROT SERPL-MCNC: 8.3 G/DL — SIGNIFICANT CHANGE UP (ref 6–8.3)
RBC # BLD: 4.16 M/UL — LOW (ref 4.2–5.8)
RBC # FLD: 13 % — SIGNIFICANT CHANGE UP (ref 10.3–14.5)
RH IG SCN BLD-IMP: POSITIVE — SIGNIFICANT CHANGE UP
SODIUM SERPL-SCNC: 139 MMOL/L — SIGNIFICANT CHANGE UP (ref 135–145)
WBC # BLD: 9.12 K/UL — SIGNIFICANT CHANGE UP (ref 3.8–10.5)
WBC # FLD AUTO: 9.12 K/UL — SIGNIFICANT CHANGE UP (ref 3.8–10.5)

## 2022-07-27 PROCEDURE — 93010 ELECTROCARDIOGRAM REPORT: CPT

## 2022-07-27 RX ORDER — METFORMIN HYDROCHLORIDE 850 MG/1
1 TABLET ORAL
Qty: 0 | Refills: 0 | DISCHARGE

## 2022-07-27 NOTE — H&P PST ADULT - PROBLEM SELECTOR PLAN 1
Pt. is scheduled for a flexible bronchoscopy, left VATs, lung resection 8/5/22.  Pt. verbalized understanding of instructions and that Chlorhexidine is for external use.

## 2022-07-27 NOTE — H&P PST ADULT - ALCOHOL USE HISTORY SINGLE SELECT
Please bring this form with you to show your primary care provider at your follow-up appointment. Primary care provider:  Dr. Brice Peña MD    Discharging provider:  Fern Yu MD    You have been admitted to the hospital with the following diagnoses:  · Hypoxia  · Fall    FOLLOW-UP CARE RECOMMENDATIONS:    APPOINTMENTS:  Follow-up Information     Follow up With Details Comments [address], [place] In 1 week discharge follow up  [address] [facility] [address] Tammie Sky 5 recommended: none    PENDING TEST RESULTS:  At the time of your discharge the following test results are still pending: none  Please make sure you review these results with your outpatient follow-up provider(s). SYMPTOMS to watch for: chest pain, shortness of breath, fever, chills, nausea, vomiting, diarrhea, change in mentation, falling, weakness, bleeding. DIET/what to eat:  Resume previous diet    ACTIVITY:  Activity as tolerated and PT/OT per Home Health    WOUND CARE: NONE    EQUIPMENT needed:  NONE      What to do if new or unexpected symptoms occur? If you experience any of the above symptoms (or should other concerns or questions arise after discharge) please call your primary care physician. Return to the emergency room if you cannot get hold of your doctor. · It is very important that you keep your follow-up appointment(s). · Please bring discharge papers, medication list (and/or medication bottles) to your follow-up appointments for review by your outpatient provider(s). · Please check the list of medications and be sure it includes every medication (even non-prescription medications) that your provider wants you to take. · It is important that you take the medication exactly as they are prescribed.    · Keep your medication in the bottles provided by the pharmacist and keep a list of the medication names, dosages, and times to be taken in your wallet. · Do not take other medications without consulting your doctor. · If you have any questions about your medications or other instructions, please talk to your nurse or care provider before you leave the hospital.    I understand that if any problems occur once I am at home I am to contact my physician. These instructions were explained to me and I had the opportunity to ask questions. yes...

## 2022-07-27 NOTE — H&P PST ADULT - NSANTHOSAYNRD_GEN_A_CORE
"snore but not loud"/No. TAYA screening performed.  STOP BANG Legend: 0-2 = LOW Risk; 3-4 = INTERMEDIATE Risk; 5-8 = HIGH Risk

## 2022-07-27 NOTE — H&P PST ADULT - NSICDXPASTMEDICALHX_GEN_ALL_CORE_FT
PAST MEDICAL HISTORY:  DM (diabetes mellitus)     HTN (hypertension)     Opiate abuse, continuous      PAST MEDICAL HISTORY:  DM (diabetes mellitus)     History of substance abuse     HTN (hypertension)     Mass of left lung     Obese     Opiate abuse, continuous

## 2022-07-27 NOTE — H&P PST ADULT - ASSESSMENT
Simple: Patient demonstrates quick and easy understanding/Verbalized Understanding Pt. is a 66 yo male accompanied by his niece.  Pt. has a left lung mass.

## 2022-08-03 LAB — SARS-COV-2 N GENE NPH QL NAA+PROBE: NOT DETECTED

## 2022-08-03 RX ORDER — SODIUM CHLORIDE 9 MG/ML
1000 INJECTION, SOLUTION INTRAVENOUS
Refills: 0 | Status: DISCONTINUED | OUTPATIENT
Start: 2022-08-05 | End: 2022-08-06

## 2022-08-04 NOTE — ASU PATIENT PROFILE, ADULT - FALL HARM RISK - UNIVERSAL INTERVENTIONS
Bed in lowest position, wheels locked, appropriate side rails in place/Call bell, personal items and telephone in reach/Instruct patient to call for assistance before getting out of bed or chair/Non-slip footwear when patient is out of bed/[place] to call system/Physically safe environment - no spills, clutter or unnecessary equipment/Purposeful Proactive Rounding/Room/bathroom lighting operational, light cord in reach

## 2022-08-04 NOTE — ASU PATIENT PROFILE, ADULT - NSICDXPASTMEDICALHX_GEN_ALL_CORE_FT
PAST MEDICAL HISTORY:  DM (diabetes mellitus)     History of substance abuse     HTN (hypertension)     Mass of left lung     Obese     Opiate abuse, continuous

## 2022-08-05 ENCOUNTER — RESULT REVIEW (OUTPATIENT)
Age: 67
End: 2022-08-05

## 2022-08-05 ENCOUNTER — INPATIENT (INPATIENT)
Facility: HOSPITAL | Age: 67
LOS: 2 days | Discharge: HOME HEALTH | End: 2022-08-08
Attending: THORACIC SURGERY (CARDIOTHORACIC VASCULAR SURGERY) | Admitting: THORACIC SURGERY (CARDIOTHORACIC VASCULAR SURGERY)
Payer: MEDICARE

## 2022-08-05 ENCOUNTER — APPOINTMENT (OUTPATIENT)
Dept: THORACIC SURGERY | Facility: HOSPITAL | Age: 67
End: 2022-08-05

## 2022-08-05 VITALS
OXYGEN SATURATION: 99 % | RESPIRATION RATE: 14 BRPM | SYSTOLIC BLOOD PRESSURE: 143 MMHG | DIASTOLIC BLOOD PRESSURE: 86 MMHG | HEART RATE: 68 BPM | HEIGHT: 71 IN | TEMPERATURE: 98 F | WEIGHT: 186.07 LBS

## 2022-08-05 DIAGNOSIS — R97.1 ELEVATED CANCER ANTIGEN 125 [CA 125]: ICD-10-CM

## 2022-08-05 LAB
GLUCOSE BLDC GLUCOMTR-MCNC: 175 MG/DL — HIGH (ref 70–99)
GLUCOSE BLDC GLUCOMTR-MCNC: 186 MG/DL — HIGH (ref 70–99)
GLUCOSE BLDC GLUCOMTR-MCNC: 97 MG/DL — SIGNIFICANT CHANGE UP (ref 70–99)
RH IG SCN BLD-IMP: POSITIVE — SIGNIFICANT CHANGE UP

## 2022-08-05 PROCEDURE — 32674 THORACOSCOPY LYMPH NODE EXC: CPT

## 2022-08-05 PROCEDURE — 88305 TISSUE EXAM BY PATHOLOGIST: CPT | Mod: 26

## 2022-08-05 PROCEDURE — 88313 SPECIAL STAINS GROUP 2: CPT | Mod: 26

## 2022-08-05 PROCEDURE — 32668 THORACOSCOPY W/W RESECT DIAG: CPT

## 2022-08-05 PROCEDURE — 88307 TISSUE EXAM BY PATHOLOGIST: CPT | Mod: 26

## 2022-08-05 PROCEDURE — 71045 X-RAY EXAM CHEST 1 VIEW: CPT | Mod: 26,77

## 2022-08-05 PROCEDURE — 32663 THORACOSCOPY W/LOBECTOMY: CPT | Mod: LT

## 2022-08-05 PROCEDURE — 31645 BRNCHSC W/THER ASPIR 1ST: CPT | Mod: 78

## 2022-08-05 PROCEDURE — 99233 SBSQ HOSP IP/OBS HIGH 50: CPT

## 2022-08-05 PROCEDURE — 88331 PATH CONSLTJ SURG 1 BLK 1SPC: CPT | Mod: 26

## 2022-08-05 PROCEDURE — 88360 TUMOR IMMUNOHISTOCHEM/MANUAL: CPT | Mod: 26

## 2022-08-05 PROCEDURE — 71045 X-RAY EXAM CHEST 1 VIEW: CPT | Mod: 26

## 2022-08-05 DEVICE — CHEST DRAIN THORACIC ARGYLE PVC 20FR STRAIGHT: Type: IMPLANTABLE DEVICE | Site: LEFT | Status: FUNCTIONAL

## 2022-08-05 DEVICE — VISTASEAL FIBRIN HUMAN 10ML: Type: IMPLANTABLE DEVICE | Site: LEFT | Status: FUNCTIONAL

## 2022-08-05 DEVICE — STAPLER ETHICON GST ECHELON 60MM GREEN RELOAD: Type: IMPLANTABLE DEVICE | Site: LEFT | Status: FUNCTIONAL

## 2022-08-05 DEVICE — CLIP APPLIER COVIDIEN ENDOCLIP III 5MM: Type: IMPLANTABLE DEVICE | Site: LEFT | Status: FUNCTIONAL

## 2022-08-05 DEVICE — STAPLER ETHICON GST ECHELON 60MM BLACK RELOAD: Type: IMPLANTABLE DEVICE | Site: LEFT | Status: FUNCTIONAL

## 2022-08-05 DEVICE — STAPLER ETHICON ECHELON ENDOPATH VASCULAR 35MM WHITE RELOAD: Type: IMPLANTABLE DEVICE | Site: LEFT | Status: FUNCTIONAL

## 2022-08-05 RX ORDER — PREGABALIN 225 MG/1
1000 CAPSULE ORAL DAILY
Refills: 0 | Status: DISCONTINUED | OUTPATIENT
Start: 2022-08-05 | End: 2022-08-08

## 2022-08-05 RX ORDER — TAMSULOSIN HYDROCHLORIDE 0.4 MG/1
0.4 CAPSULE ORAL AT BEDTIME
Refills: 0 | Status: DISCONTINUED | OUTPATIENT
Start: 2022-08-05 | End: 2022-08-05

## 2022-08-05 RX ORDER — GABAPENTIN 400 MG/1
100 CAPSULE ORAL ONCE
Refills: 0 | Status: COMPLETED | OUTPATIENT
Start: 2022-08-05 | End: 2022-08-05

## 2022-08-05 RX ORDER — ACETAMINOPHEN 500 MG
1000 TABLET ORAL ONCE
Refills: 0 | Status: COMPLETED | OUTPATIENT
Start: 2022-08-05 | End: 2022-08-05

## 2022-08-05 RX ORDER — HYDROMORPHONE HYDROCHLORIDE 2 MG/ML
0.5 INJECTION INTRAMUSCULAR; INTRAVENOUS; SUBCUTANEOUS
Refills: 0 | Status: DISCONTINUED | OUTPATIENT
Start: 2022-08-05 | End: 2022-08-06

## 2022-08-05 RX ORDER — HYDRALAZINE HCL 50 MG
5 TABLET ORAL ONCE
Refills: 0 | Status: COMPLETED | OUTPATIENT
Start: 2022-08-05 | End: 2022-08-05

## 2022-08-05 RX ORDER — LIDOCAINE HCL 20 MG/ML
4 VIAL (ML) INJECTION ONCE
Refills: 0 | Status: COMPLETED | OUTPATIENT
Start: 2022-08-05 | End: 2022-08-05

## 2022-08-05 RX ORDER — IPRATROPIUM/ALBUTEROL SULFATE 18-103MCG
3 AEROSOL WITH ADAPTER (GRAM) INHALATION EVERY 6 HOURS
Refills: 0 | Status: DISCONTINUED | OUTPATIENT
Start: 2022-08-05 | End: 2022-08-06

## 2022-08-05 RX ORDER — ACETAMINOPHEN 500 MG
975 TABLET ORAL EVERY 6 HOURS
Refills: 0 | Status: DISCONTINUED | OUTPATIENT
Start: 2022-08-05 | End: 2022-08-06

## 2022-08-05 RX ORDER — HYDROMORPHONE HYDROCHLORIDE 2 MG/ML
1 INJECTION INTRAMUSCULAR; INTRAVENOUS; SUBCUTANEOUS
Refills: 0 | Status: DISCONTINUED | OUTPATIENT
Start: 2022-08-05 | End: 2022-08-06

## 2022-08-05 RX ORDER — KETOROLAC TROMETHAMINE 30 MG/ML
15 SYRINGE (ML) INJECTION ONCE
Refills: 0 | Status: DISCONTINUED | OUTPATIENT
Start: 2022-08-05 | End: 2022-08-06

## 2022-08-05 RX ORDER — DORNASE ALFA 1 MG/ML
2.5 SOLUTION RESPIRATORY (INHALATION) EVERY 12 HOURS
Refills: 0 | Status: DISCONTINUED | OUTPATIENT
Start: 2022-08-05 | End: 2022-08-08

## 2022-08-05 RX ORDER — NALOXONE HYDROCHLORIDE 4 MG/.1ML
0.1 SPRAY NASAL
Refills: 0 | Status: DISCONTINUED | OUTPATIENT
Start: 2022-08-05 | End: 2022-08-08

## 2022-08-05 RX ORDER — ATORVASTATIN CALCIUM 80 MG/1
10 TABLET, FILM COATED ORAL AT BEDTIME
Refills: 0 | Status: DISCONTINUED | OUTPATIENT
Start: 2022-08-05 | End: 2022-08-08

## 2022-08-05 RX ORDER — PROPOFOL 10 MG/ML
50 INJECTION, EMULSION INTRAVENOUS ONCE
Refills: 0 | Status: COMPLETED | OUTPATIENT
Start: 2022-08-05 | End: 2022-08-05

## 2022-08-05 RX ORDER — LIDOCAINE 4 G/100G
1 CREAM TOPICAL ONCE
Refills: 0 | Status: COMPLETED | OUTPATIENT
Start: 2022-08-05 | End: 2022-08-05

## 2022-08-05 RX ORDER — HEPARIN SODIUM 5000 [USP'U]/ML
5000 INJECTION INTRAVENOUS; SUBCUTANEOUS ONCE
Refills: 0 | Status: COMPLETED | OUTPATIENT
Start: 2022-08-05 | End: 2022-08-05

## 2022-08-05 RX ORDER — SENNA PLUS 8.6 MG/1
2 TABLET ORAL AT BEDTIME
Refills: 0 | Status: DISCONTINUED | OUTPATIENT
Start: 2022-08-05 | End: 2022-08-08

## 2022-08-05 RX ORDER — FINASTERIDE 5 MG/1
5 TABLET, FILM COATED ORAL DAILY
Refills: 0 | Status: DISCONTINUED | OUTPATIENT
Start: 2022-08-05 | End: 2022-08-08

## 2022-08-05 RX ORDER — ONDANSETRON 8 MG/1
4 TABLET, FILM COATED ORAL EVERY 6 HOURS
Refills: 0 | Status: DISCONTINUED | OUTPATIENT
Start: 2022-08-05 | End: 2022-08-08

## 2022-08-05 RX ORDER — SODIUM CHLORIDE 9 MG/ML
4 INJECTION INTRAMUSCULAR; INTRAVENOUS; SUBCUTANEOUS EVERY 12 HOURS
Refills: 0 | Status: DISCONTINUED | OUTPATIENT
Start: 2022-08-05 | End: 2022-08-06

## 2022-08-05 RX ORDER — POLYETHYLENE GLYCOL 3350 17 G/17G
17 POWDER, FOR SOLUTION ORAL DAILY
Refills: 0 | Status: DISCONTINUED | OUTPATIENT
Start: 2022-08-05 | End: 2022-08-07

## 2022-08-05 RX ORDER — FERROUS SULFATE 325(65) MG
325 TABLET ORAL DAILY
Refills: 0 | Status: DISCONTINUED | OUTPATIENT
Start: 2022-08-05 | End: 2022-08-06

## 2022-08-05 RX ORDER — INSULIN LISPRO 100/ML
VIAL (ML) SUBCUTANEOUS
Refills: 0 | Status: DISCONTINUED | OUTPATIENT
Start: 2022-08-05 | End: 2022-08-06

## 2022-08-05 RX ORDER — ALBUTEROL 90 UG/1
2 AEROSOL, METERED ORAL EVERY 6 HOURS
Refills: 0 | Status: DISCONTINUED | OUTPATIENT
Start: 2022-08-05 | End: 2022-08-05

## 2022-08-05 RX ORDER — HEPARIN SODIUM 5000 [USP'U]/ML
5000 INJECTION INTRAVENOUS; SUBCUTANEOUS EVERY 8 HOURS
Refills: 0 | Status: DISCONTINUED | OUTPATIENT
Start: 2022-08-05 | End: 2022-08-08

## 2022-08-05 RX ORDER — ACETAMINOPHEN 500 MG
975 TABLET ORAL ONCE
Refills: 0 | Status: COMPLETED | OUTPATIENT
Start: 2022-08-05 | End: 2022-08-05

## 2022-08-05 RX ORDER — HYDROMORPHONE HYDROCHLORIDE 2 MG/ML
30 INJECTION INTRAMUSCULAR; INTRAVENOUS; SUBCUTANEOUS
Refills: 0 | Status: DISCONTINUED | OUTPATIENT
Start: 2022-08-05 | End: 2022-08-06

## 2022-08-05 RX ORDER — TAMSULOSIN HYDROCHLORIDE 0.4 MG/1
0.4 CAPSULE ORAL DAILY
Refills: 0 | Status: DISCONTINUED | OUTPATIENT
Start: 2022-08-05 | End: 2022-08-08

## 2022-08-05 RX ADMIN — Medication 5 MILLIGRAM(S): at 18:45

## 2022-08-05 RX ADMIN — Medication 3 MILLILITER(S): at 14:13

## 2022-08-05 RX ADMIN — HEPARIN SODIUM 5000 UNIT(S): 5000 INJECTION INTRAVENOUS; SUBCUTANEOUS at 22:10

## 2022-08-05 RX ADMIN — Medication 400 MILLIGRAM(S): at 14:24

## 2022-08-05 RX ADMIN — TAMSULOSIN HYDROCHLORIDE 0.4 MILLIGRAM(S): 0.4 CAPSULE ORAL at 22:41

## 2022-08-05 RX ADMIN — HEPARIN SODIUM 5000 UNIT(S): 5000 INJECTION INTRAVENOUS; SUBCUTANEOUS at 14:32

## 2022-08-05 RX ADMIN — Medication 1000 MILLIGRAM(S): at 14:54

## 2022-08-05 RX ADMIN — Medication 975 MILLIGRAM(S): at 07:30

## 2022-08-05 RX ADMIN — ATORVASTATIN CALCIUM 10 MILLIGRAM(S): 80 TABLET, FILM COATED ORAL at 22:41

## 2022-08-05 RX ADMIN — Medication 975 MILLIGRAM(S): at 07:19

## 2022-08-05 RX ADMIN — HYDROMORPHONE HYDROCHLORIDE 0.5 MILLIGRAM(S): 2 INJECTION INTRAMUSCULAR; INTRAVENOUS; SUBCUTANEOUS at 13:40

## 2022-08-05 RX ADMIN — LIDOCAINE 1 APPLICATION(S): 4 CREAM TOPICAL at 17:33

## 2022-08-05 RX ADMIN — HYDROMORPHONE HYDROCHLORIDE 30 MILLILITER(S): 2 INJECTION INTRAMUSCULAR; INTRAVENOUS; SUBCUTANEOUS at 10:57

## 2022-08-05 RX ADMIN — SENNA PLUS 2 TABLET(S): 8.6 TABLET ORAL at 22:08

## 2022-08-05 RX ADMIN — Medication 4 MILLILITER(S): at 17:19

## 2022-08-05 RX ADMIN — Medication 3 MILLILITER(S): at 22:49

## 2022-08-05 RX ADMIN — SODIUM CHLORIDE 4 MILLILITER(S): 9 INJECTION INTRAMUSCULAR; INTRAVENOUS; SUBCUTANEOUS at 22:49

## 2022-08-05 RX ADMIN — Medication 1: at 12:04

## 2022-08-05 RX ADMIN — SODIUM CHLORIDE 30 MILLILITER(S): 9 INJECTION, SOLUTION INTRAVENOUS at 07:30

## 2022-08-05 RX ADMIN — Medication 4 MILLILITER(S): at 17:00

## 2022-08-05 RX ADMIN — HEPARIN SODIUM 5000 UNIT(S): 5000 INJECTION INTRAVENOUS; SUBCUTANEOUS at 07:19

## 2022-08-05 RX ADMIN — PROPOFOL 50 MILLIGRAM(S): 10 INJECTION, EMULSION INTRAVENOUS at 17:31

## 2022-08-05 RX ADMIN — Medication 1: at 17:32

## 2022-08-05 RX ADMIN — GABAPENTIN 100 MILLIGRAM(S): 400 CAPSULE ORAL at 07:19

## 2022-08-05 RX ADMIN — Medication 20 MILLIGRAM(S): at 22:09

## 2022-08-05 NOTE — BRIEF OPERATIVE NOTE - OPERATION/FINDINGS
FB, left uniportal VATS, wedge (frozen: malignancy), completion LULobectomy, MLND: levels 5, 7, 9, 10, 11  24F L CT

## 2022-08-05 NOTE — PROGRESS NOTE ADULT - SUBJECTIVE AND OBJECTIVE BOX
CHIEF COMPLAINT: FOLLOW UP IN ICU FOR POSTOPERATIVE CARE OF PATIENT WHO IS S/P  LVATS, CHALINO lobectomy            PROCEDURES:       FB, left uniportal VATS, wedge (frozen: malignancy), completion LULobectomy05-Aug-2022         ISSUES:          Lung mass   Postoperative pain   Chest tube in place   HTN    DM   Substance abuse            INTERVAL EVENTS:      OR today. Extubated in OR. Transferred to CTICU.               HISTORY:      Patient reports moderate pain at chest wall incision sites which is worse with coughing and deep breathing without associated fever or dyspnea. Pain is improved with use of pain meds.           PHYSICAL EXAM:      Gen: Comfortable, No acute distress     Eyes: Sclera white, Conjunctiva normal, Eyelids normal, Pupils symmetrical      ENT: Mucous membranes moist,  ,  ,       Neck: Trachea midline,  ,  ,  ,  ,  ,       CV: Rate regular, Rhythm regular,  ,  ,       Resp: Breath sounds clear, No accessory muscles use, L. chest tube in place,  ,       Abd: Soft, Non-distended, Non-tender, Bowel sounds normal,  ,  ,       Skin: Warm, No peripheral edema of lower extremities,  ,       : No watts     Neuro: Moving all 4 extremities,       Psych: A&Ox3               ASSESSMENT AND PLAN:           NEURO:     Post-operative Pain - Stable. Pain control with PCA and Tylenol IV PRN.           RESPIRATORY:   Hypoxia - Wean nasal cannula for goal O2sat above 92. Obtain CXR. Incentive spirometry. Chest PT and frequent suctioning. Continue bronchodilators. OOB to chair & ambulate w/ assistance. Continuous pulse oximetry for support & to prevent decompensation.       Chest tube – Pleurevac regulated water seal. Monitor chest tube output.          CARDIOVASCULAR:     Hemodynamically stable - Not on pressors. Continue hemodynamic monitoring.     Telemetry (medical test) - Reviewed by me today independently. Normal sinus rhythm.           RENAL:     Stable - Monitor IOs and electrolytes. Keep K above 4.0 and Mg above 2.0.           GASTROINTESTINAL:     GI prophylaxis not indicated     Zofran and Reglan IV PRN for nausea     Regular consistency diet           HEMATOLOGIC:     No signs of active bleeding. Monitor Hgb in CBC in AM     DVT prophylaxis with heparin subQ and SCDs.             INFECTIOUS DISEASE:     All surgical sites appear clean. No signs of active infection. Will monitor for fever and leukocytosis.           ENDOCRINE:     Stable – Monitor glucose fingersticks for goal 120-180.           ONCOLOGY:     Lung nodule - Improved. S/P resection. Follow up final pathology.          Pertinent clinical, laboratory, radiographic, hemodynamic, echocardiographic, respiratory data, microbiologic data and chart were reviewed by myself and analyzed frequently throughout the course of the day and night by myself.     Plan discussed at length with the CTICU staff and Attending CT Surgeon -   Dr Anthony Randhawa     Patient's status was discussed with patient at bedside.     _________________________  VITAL SIGNS:  Vital Signs Last 24 Hrs  T(C): 36.6 (05 Aug 2022 12:05), Max: 36.6 (05 Aug 2022 10:50)  T(F): 97.8 (05 Aug 2022 12:05), Max: 97.9 (05 Aug 2022 10:50)  HR: 71 (05 Aug 2022 13:05) (68 - 78)  BP: 132/95 (05 Aug 2022 13:05) (124/81 - 151/86)  BP(mean): 107 (05 Aug 2022 13:05) (96 - 109)  RR: 13 (05 Aug 2022 13:05) (12 - 25)  SpO2: 98% (05 Aug 2022 13:05) (96% - 99%)    Parameters below as of 05 Aug 2022 12:05  Patient On (Oxygen Delivery Method): nasal cannula w/ humidification  O2 Flow (L/min): 4    I/Os:   I&O's Detail    05 Aug 2022 07:01  -  05 Aug 2022 15:00  --------------------------------------------------------  IN:    IV PiggyBack: 50 mL    Lactated Ringers: 150 mL  Total IN: 200 mL    OUT:    Oral Fluid: 0 mL  Total OUT: 0 mL    Total NET: 200 mL              MEDICATIONS:  MEDICATIONS  (STANDING):  acetaminophen     Tablet .. 975 milliGRAM(s) Oral every 6 hours  atorvastatin 10 milliGRAM(s) Oral at bedtime  cyanocobalamin 1000 MICROGram(s) Oral daily  ferrous    sulfate 325 milliGRAM(s) Oral daily  finasteride 5 milliGRAM(s) Oral daily  heparin   Injectable 5000 Unit(s) SubCutaneous every 8 hours  HYDROmorphone PCA (1 mG/mL) 30 milliLiter(s) PCA Continuous PCA Continuous  insulin lispro (ADMELOG) corrective regimen sliding scale   SubCutaneous Before meals and at bedtime  lactated ringers. 1000 milliLiter(s) (30 mL/Hr) IV Continuous <Continuous>  polyethylene glycol 3350 17 Gram(s) Oral daily  senna 2 Tablet(s) Oral at bedtime  tamsulosin 0.4 milliGRAM(s) Oral at bedtime    MEDICATIONS  (PRN):  HYDROmorphone  Injectable 0.5 milliGRAM(s) IV Push every 10 minutes PRN Moderate Pain (4 - 6)  HYDROmorphone  Injectable 1 milliGRAM(s) IV Push every 10 minutes PRN Severe Pain (7 - 10)  HYDROmorphone PCA (1 mG/mL) Rescue Clinician Bolus 0.5 milliGRAM(s) IV Push every 15 minutes PRN for Pain Scale GREATER THAN 6  naloxone Injectable 0.1 milliGRAM(s) IV Push every 3 minutes PRN For ANY of the following changes in patient status:  A. RR LESS THAN 10 breaths per minute, B. Oxygen saturation LESS THAN 90%, C. Sedation score of 6  ondansetron Injectable 4 milliGRAM(s) IV Push every 6 hours PRN Nausea      LABS:  Laboratory data was independently reviewed by me today.                       RADIOLOGY:   Radiology images were independently reviewed by me today. Reports were reviewed by me today.    Xray Chest 1 View- PORTABLE-Urgent:   ACC: [id number] EXAM:  XR CHEST PORTABLE URGENT 1V                          PROCEDURE DATE:  08/05/2022          INTERPRETATION:  INDICATION: Status post LVATS for a lung mass.    COMPARISON: CT chest 5/5/2022.    FINDINGS:  Lines/Devices: Left chesttube.  Heart/Vascular: Heart size cannot be accurately assessed on this limited   portable projection.  Pulmonary: Bilateral linear opacities consistent with atelectasis. Left   midlung surgical clips. No pleural effusion or pneumothorax.  Bones: No acute findings.    Impression: Status post left thoracotomy with chest tube.          --- End of Report ---          EDWIGE PEREZ MD; Resident Radiology  This document has been electronically signed.  ELPIDIO CASEY MD; Attending Radiologist  This document has been electronically signed. Aug  5 2022 12:15PM (08-05-22 @ 11:19)                           ________________________________________________                      CHIEF COMPLAINT: FOLLOW UP IN ICU FOR POSTOPERATIVE CARE OF PATIENT WHO IS S/P  LVATS, CHALINO lobectomy            PROCEDURES:       FB, left uniportal VATS, wedge (frozen: malignancy), completion LULobectomy05-Aug-2022         ISSUES:          Lung mass   Postoperative pain   Chest tube in place   HTN    DM   Substance abuse   Left lung collapse            INTERVAL EVENTS:      OR today. Extubated in OR. Transferred to CTICU.     Noted to desat few hours after being transferred to ICU, required rescue clinician Juvencio caban for pain control. CXR with left lung collapse requiring bedside bronchoscopy         HISTORY:      Patient reports moderate pain at chest wall incision sites which is worse with coughing and deep breathing without associated fever or dyspnea. Pain is improved with use of pain meds.           PHYSICAL EXAM:      Gen: Comfortable, No acute distress     Eyes: Sclera white, Conjunctiva normal, Eyelids normal, Pupils symmetrical      ENT: Mucous membranes moist,  ,  ,       Neck: Trachea midline,  ,  ,  ,  ,  ,       CV: Rate regular, Rhythm regular,  ,  ,       Resp: Breath sounds clear, No accessory muscles use, L. chest tube in place,  ,       Abd: Soft, Non-distended, Non-tender, Bowel sounds normal,  ,  ,       Skin: Warm, No peripheral edema of lower extremities,  ,       : No watts     Neuro: Moving all 4 extremities,       Psych: A&Ox3               ASSESSMENT AND PLAN:           NEURO:     Post-operative Pain - Stable. Pain control with PCA and Tylenol IV PRN.           RESPIRATORY:   Hypoxia - Wean nasal cannula for goal O2sat above 92. Obtain CXR. Incentive spirometry. Chest PT and frequent suctioning. Continue bronchodilators. OOB to chair & ambulate w/ assistance. Continuous pulse oximetry for support & to prevent decompensation.       Chest tube – Pleurevac regulated water seal. Monitor chest tube output.          CARDIOVASCULAR:     Hemodynamically stable - Not on pressors. Continue hemodynamic monitoring.     Telemetry (medical test) - Reviewed by me today independently. Normal sinus rhythm.           RENAL:     Stable - Monitor IOs and electrolytes. Keep K above 4.0 and Mg above 2.0.           GASTROINTESTINAL:     GI prophylaxis not indicated     Zofran and Reglan IV PRN for nausea     Regular consistency diet           HEMATOLOGIC:     No signs of active bleeding. Monitor Hgb in CBC in AM     DVT prophylaxis with heparin subQ and SCDs.             INFECTIOUS DISEASE:     All surgical sites appear clean. No signs of active infection. Will monitor for fever and leukocytosis.           ENDOCRINE:     Stable – Monitor glucose fingersticks for goal 120-180.           ONCOLOGY:     Lung nodule - Improved. S/P resection. Follow up final pathology.          Pertinent clinical, laboratory, radiographic, hemodynamic, echocardiographic, respiratory data, microbiologic data and chart were reviewed by myself and analyzed frequently throughout the course of the day and night by myself.     Plan discussed at length with the CTICU staff and Attending CT Surgeon -   Dr Anthony Randhawa     Patient's status was discussed with patient at bedside.     _________________________  VITAL SIGNS:  Vital Signs Last 24 Hrs  T(C): 36.6 (05 Aug 2022 12:05), Max: 36.6 (05 Aug 2022 10:50)  T(F): 97.8 (05 Aug 2022 12:05), Max: 97.9 (05 Aug 2022 10:50)  HR: 71 (05 Aug 2022 13:05) (68 - 78)  BP: 132/95 (05 Aug 2022 13:05) (124/81 - 151/86)  BP(mean): 107 (05 Aug 2022 13:05) (96 - 109)  RR: 13 (05 Aug 2022 13:05) (12 - 25)  SpO2: 98% (05 Aug 2022 13:05) (96% - 99%)    Parameters below as of 05 Aug 2022 12:05  Patient On (Oxygen Delivery Method): nasal cannula w/ humidification  O2 Flow (L/min): 4    I/Os:   I&O's Detail    05 Aug 2022 07:01  -  05 Aug 2022 15:00  --------------------------------------------------------  IN:    IV PiggyBack: 50 mL    Lactated Ringers: 150 mL  Total IN: 200 mL    OUT:    Oral Fluid: 0 mL  Total OUT: 0 mL    Total NET: 200 mL              MEDICATIONS:  MEDICATIONS  (STANDING):  acetaminophen     Tablet .. 975 milliGRAM(s) Oral every 6 hours  atorvastatin 10 milliGRAM(s) Oral at bedtime  cyanocobalamin 1000 MICROGram(s) Oral daily  ferrous    sulfate 325 milliGRAM(s) Oral daily  finasteride 5 milliGRAM(s) Oral daily  heparin   Injectable 5000 Unit(s) SubCutaneous every 8 hours  HYDROmorphone PCA (1 mG/mL) 30 milliLiter(s) PCA Continuous PCA Continuous  insulin lispro (ADMELOG) corrective regimen sliding scale   SubCutaneous Before meals and at bedtime  lactated ringers. 1000 milliLiter(s) (30 mL/Hr) IV Continuous <Continuous>  polyethylene glycol 3350 17 Gram(s) Oral daily  senna 2 Tablet(s) Oral at bedtime  tamsulosin 0.4 milliGRAM(s) Oral at bedtime    MEDICATIONS  (PRN):  HYDROmorphone  Injectable 0.5 milliGRAM(s) IV Push every 10 minutes PRN Moderate Pain (4 - 6)  HYDROmorphone  Injectable 1 milliGRAM(s) IV Push every 10 minutes PRN Severe Pain (7 - 10)  HYDROmorphone PCA (1 mG/mL) Rescue Clinician Bolus 0.5 milliGRAM(s) IV Push every 15 minutes PRN for Pain Scale GREATER THAN 6  naloxone Injectable 0.1 milliGRAM(s) IV Push every 3 minutes PRN For ANY of the following changes in patient status:  A. RR LESS THAN 10 breaths per minute, B. Oxygen saturation LESS THAN 90%, C. Sedation score of 6  ondansetron Injectable 4 milliGRAM(s) IV Push every 6 hours PRN Nausea      LABS:  Laboratory data was independently reviewed by me today.                       RADIOLOGY:   Radiology images were independently reviewed by me today. Reports were reviewed by me today.    Xray Chest 1 View- PORTABLE-Urgent:   ACC: [id number] EXAM:  XR CHEST PORTABLE URGENT 1V                          PROCEDURE DATE:  08/05/2022          INTERPRETATION:  INDICATION: Status post LVATS for a lung mass.    COMPARISON: CT chest 5/5/2022.    FINDINGS:  Lines/Devices: Left chesttube.  Heart/Vascular: Heart size cannot be accurately assessed on this limited   portable projection.  Pulmonary: Bilateral linear opacities consistent with atelectasis. Left   midlung surgical clips. No pleural effusion or pneumothorax.  Bones: No acute findings.    Impression: Status post left thoracotomy with chest tube.          --- End of Report ---          EDWIGE PEREZ MD; Resident Radiology  This document has been electronically signed.  ELPIDIO CASEY MD; Attending Radiologist  This document has been electronically signed. Aug  5 2022 12:15PM (08-05-22 @ 11:19)                           ________________________________________________

## 2022-08-06 LAB
ANION GAP SERPL CALC-SCNC: 13 MMOL/L — SIGNIFICANT CHANGE UP (ref 7–14)
BASOPHILS # BLD AUTO: 0.03 K/UL — SIGNIFICANT CHANGE UP (ref 0–0.2)
BASOPHILS NFR BLD AUTO: 0.2 % — SIGNIFICANT CHANGE UP (ref 0–2)
BUN SERPL-MCNC: 16 MG/DL — SIGNIFICANT CHANGE UP (ref 7–23)
CALCIUM SERPL-MCNC: 9 MG/DL — SIGNIFICANT CHANGE UP (ref 8.4–10.5)
CHLORIDE SERPL-SCNC: 101 MMOL/L — SIGNIFICANT CHANGE UP (ref 98–107)
CO2 SERPL-SCNC: 22 MMOL/L — SIGNIFICANT CHANGE UP (ref 22–31)
CREAT SERPL-MCNC: 0.89 MG/DL — SIGNIFICANT CHANGE UP (ref 0.5–1.3)
EGFR: 95 ML/MIN/1.73M2 — SIGNIFICANT CHANGE UP
EOSINOPHIL # BLD AUTO: 0.03 K/UL — SIGNIFICANT CHANGE UP (ref 0–0.5)
EOSINOPHIL NFR BLD AUTO: 0.2 % — SIGNIFICANT CHANGE UP (ref 0–6)
GLUCOSE BLDC GLUCOMTR-MCNC: 109 MG/DL — HIGH (ref 70–99)
GLUCOSE BLDC GLUCOMTR-MCNC: 128 MG/DL — HIGH (ref 70–99)
GLUCOSE BLDC GLUCOMTR-MCNC: 130 MG/DL — HIGH (ref 70–99)
GLUCOSE BLDC GLUCOMTR-MCNC: 133 MG/DL — HIGH (ref 70–99)
GLUCOSE SERPL-MCNC: 126 MG/DL — HIGH (ref 70–99)
HCT VFR BLD CALC: 40.2 % — SIGNIFICANT CHANGE UP (ref 39–50)
HGB BLD-MCNC: 12.9 G/DL — LOW (ref 13–17)
IANC: 10.33 K/UL — HIGH (ref 1.8–7.4)
IMM GRANULOCYTES NFR BLD AUTO: 0.3 % — SIGNIFICANT CHANGE UP (ref 0–1.5)
LYMPHOCYTES # BLD AUTO: 17.7 % — SIGNIFICANT CHANGE UP (ref 13–44)
LYMPHOCYTES # BLD AUTO: 2.5 K/UL — SIGNIFICANT CHANGE UP (ref 1–3.3)
MAGNESIUM SERPL-MCNC: 1.8 MG/DL — SIGNIFICANT CHANGE UP (ref 1.6–2.6)
MCHC RBC-ENTMCNC: 30.8 PG — SIGNIFICANT CHANGE UP (ref 27–34)
MCHC RBC-ENTMCNC: 32.1 GM/DL — SIGNIFICANT CHANGE UP (ref 32–36)
MCV RBC AUTO: 95.9 FL — SIGNIFICANT CHANGE UP (ref 80–100)
MONOCYTES # BLD AUTO: 1.17 K/UL — HIGH (ref 0–0.9)
MONOCYTES NFR BLD AUTO: 8.3 % — SIGNIFICANT CHANGE UP (ref 2–14)
NEUTROPHILS # BLD AUTO: 10.33 K/UL — HIGH (ref 1.8–7.4)
NEUTROPHILS NFR BLD AUTO: 73.3 % — SIGNIFICANT CHANGE UP (ref 43–77)
NRBC # BLD: 0 /100 WBCS — SIGNIFICANT CHANGE UP
NRBC # FLD: 0 K/UL — SIGNIFICANT CHANGE UP
PLATELET # BLD AUTO: 286 K/UL — SIGNIFICANT CHANGE UP (ref 150–400)
POTASSIUM SERPL-MCNC: 4.1 MMOL/L — SIGNIFICANT CHANGE UP (ref 3.5–5.3)
POTASSIUM SERPL-SCNC: 4.1 MMOL/L — SIGNIFICANT CHANGE UP (ref 3.5–5.3)
RBC # BLD: 4.19 M/UL — LOW (ref 4.2–5.8)
RBC # FLD: 13.3 % — SIGNIFICANT CHANGE UP (ref 10.3–14.5)
SODIUM SERPL-SCNC: 136 MMOL/L — SIGNIFICANT CHANGE UP (ref 135–145)
WBC # BLD: 14.1 K/UL — HIGH (ref 3.8–10.5)
WBC # FLD AUTO: 14.1 K/UL — HIGH (ref 3.8–10.5)

## 2022-08-06 PROCEDURE — 71045 X-RAY EXAM CHEST 1 VIEW: CPT | Mod: 26

## 2022-08-06 PROCEDURE — 99233 SBSQ HOSP IP/OBS HIGH 50: CPT

## 2022-08-06 RX ORDER — LIDOCAINE 4 G/100G
1 CREAM TOPICAL DAILY
Refills: 0 | Status: DISCONTINUED | OUTPATIENT
Start: 2022-08-06 | End: 2022-08-08

## 2022-08-06 RX ORDER — INSULIN LISPRO 100/ML
VIAL (ML) SUBCUTANEOUS
Refills: 0 | Status: DISCONTINUED | OUTPATIENT
Start: 2022-08-06 | End: 2022-08-08

## 2022-08-06 RX ORDER — OXYCODONE HYDROCHLORIDE 5 MG/1
10 TABLET ORAL
Refills: 0 | Status: DISCONTINUED | OUTPATIENT
Start: 2022-08-06 | End: 2022-08-08

## 2022-08-06 RX ORDER — ACETAMINOPHEN 500 MG
1000 TABLET ORAL EVERY 6 HOURS
Refills: 0 | Status: COMPLETED | OUTPATIENT
Start: 2022-08-06 | End: 2022-08-07

## 2022-08-06 RX ORDER — INSULIN LISPRO 100/ML
VIAL (ML) SUBCUTANEOUS AT BEDTIME
Refills: 0 | Status: DISCONTINUED | OUTPATIENT
Start: 2022-08-06 | End: 2022-08-08

## 2022-08-06 RX ORDER — ACETAMINOPHEN 500 MG
1000 TABLET ORAL ONCE
Refills: 0 | Status: DISCONTINUED | OUTPATIENT
Start: 2022-08-06 | End: 2022-08-08

## 2022-08-06 RX ORDER — ALBUTEROL 90 UG/1
2.5 AEROSOL, METERED ORAL EVERY 6 HOURS
Refills: 0 | Status: DISCONTINUED | OUTPATIENT
Start: 2022-08-06 | End: 2022-08-07

## 2022-08-06 RX ORDER — SODIUM CHLORIDE 9 MG/ML
4 INJECTION INTRAMUSCULAR; INTRAVENOUS; SUBCUTANEOUS EVERY 6 HOURS
Refills: 0 | Status: DISCONTINUED | OUTPATIENT
Start: 2022-08-06 | End: 2022-08-08

## 2022-08-06 RX ORDER — KETOROLAC TROMETHAMINE 30 MG/ML
15 SYRINGE (ML) INJECTION EVERY 8 HOURS
Refills: 0 | Status: DISCONTINUED | OUTPATIENT
Start: 2022-08-06 | End: 2022-08-08

## 2022-08-06 RX ORDER — OXYCODONE HYDROCHLORIDE 5 MG/1
5 TABLET ORAL
Refills: 0 | Status: DISCONTINUED | OUTPATIENT
Start: 2022-08-06 | End: 2022-08-08

## 2022-08-06 RX ORDER — HYDROMORPHONE HYDROCHLORIDE 2 MG/ML
0.5 INJECTION INTRAMUSCULAR; INTRAVENOUS; SUBCUTANEOUS
Refills: 0 | Status: DISCONTINUED | OUTPATIENT
Start: 2022-08-06 | End: 2022-08-08

## 2022-08-06 RX ORDER — MAGNESIUM SULFATE 500 MG/ML
2 VIAL (ML) INJECTION ONCE
Refills: 0 | Status: COMPLETED | OUTPATIENT
Start: 2022-08-06 | End: 2022-08-06

## 2022-08-06 RX ORDER — GABAPENTIN 400 MG/1
100 CAPSULE ORAL EVERY 8 HOURS
Refills: 0 | Status: DISCONTINUED | OUTPATIENT
Start: 2022-08-06 | End: 2022-08-08

## 2022-08-06 RX ADMIN — Medication 15 MILLIGRAM(S): at 23:37

## 2022-08-06 RX ADMIN — LIDOCAINE 1 PATCH: 4 CREAM TOPICAL at 18:29

## 2022-08-06 RX ADMIN — GABAPENTIN 100 MILLIGRAM(S): 400 CAPSULE ORAL at 13:06

## 2022-08-06 RX ADMIN — OXYCODONE HYDROCHLORIDE 5 MILLIGRAM(S): 5 TABLET ORAL at 23:08

## 2022-08-06 RX ADMIN — Medication 975 MILLIGRAM(S): at 06:56

## 2022-08-06 RX ADMIN — Medication 400 MILLIGRAM(S): at 11:03

## 2022-08-06 RX ADMIN — SODIUM CHLORIDE 4 MILLILITER(S): 9 INJECTION INTRAMUSCULAR; INTRAVENOUS; SUBCUTANEOUS at 22:18

## 2022-08-06 RX ADMIN — ALBUTEROL 2.5 MILLIGRAM(S): 90 AEROSOL, METERED ORAL at 16:56

## 2022-08-06 RX ADMIN — Medication 400 MILLIGRAM(S): at 17:17

## 2022-08-06 RX ADMIN — LIDOCAINE 1 PATCH: 4 CREAM TOPICAL at 11:01

## 2022-08-06 RX ADMIN — Medication 15 MILLIGRAM(S): at 22:37

## 2022-08-06 RX ADMIN — TAMSULOSIN HYDROCHLORIDE 0.4 MILLIGRAM(S): 0.4 CAPSULE ORAL at 11:00

## 2022-08-06 RX ADMIN — Medication 20 MILLIGRAM(S): at 06:25

## 2022-08-06 RX ADMIN — SODIUM CHLORIDE 4 MILLILITER(S): 9 INJECTION INTRAMUSCULAR; INTRAVENOUS; SUBCUTANEOUS at 16:56

## 2022-08-06 RX ADMIN — SODIUM CHLORIDE 4 MILLILITER(S): 9 INJECTION INTRAMUSCULAR; INTRAVENOUS; SUBCUTANEOUS at 10:50

## 2022-08-06 RX ADMIN — DORNASE ALFA 2.5 MILLIGRAM(S): 1 SOLUTION RESPIRATORY (INHALATION) at 22:18

## 2022-08-06 RX ADMIN — OXYCODONE HYDROCHLORIDE 5 MILLIGRAM(S): 5 TABLET ORAL at 22:08

## 2022-08-06 RX ADMIN — HEPARIN SODIUM 5000 UNIT(S): 5000 INJECTION INTRAVENOUS; SUBCUTANEOUS at 06:26

## 2022-08-06 RX ADMIN — ONDANSETRON 4 MILLIGRAM(S): 8 TABLET, FILM COATED ORAL at 22:19

## 2022-08-06 RX ADMIN — HEPARIN SODIUM 5000 UNIT(S): 5000 INJECTION INTRAVENOUS; SUBCUTANEOUS at 13:06

## 2022-08-06 RX ADMIN — FINASTERIDE 5 MILLIGRAM(S): 5 TABLET, FILM COATED ORAL at 11:01

## 2022-08-06 RX ADMIN — Medication 975 MILLIGRAM(S): at 00:52

## 2022-08-06 RX ADMIN — DORNASE ALFA 2.5 MILLIGRAM(S): 1 SOLUTION RESPIRATORY (INHALATION) at 10:48

## 2022-08-06 RX ADMIN — ATORVASTATIN CALCIUM 10 MILLIGRAM(S): 80 TABLET, FILM COATED ORAL at 21:56

## 2022-08-06 RX ADMIN — POLYETHYLENE GLYCOL 3350 17 GRAM(S): 17 POWDER, FOR SOLUTION ORAL at 11:01

## 2022-08-06 RX ADMIN — HEPARIN SODIUM 5000 UNIT(S): 5000 INJECTION INTRAVENOUS; SUBCUTANEOUS at 22:09

## 2022-08-06 RX ADMIN — Medication 975 MILLIGRAM(S): at 06:23

## 2022-08-06 RX ADMIN — ALBUTEROL 2.5 MILLIGRAM(S): 90 AEROSOL, METERED ORAL at 10:48

## 2022-08-06 RX ADMIN — PREGABALIN 1000 MICROGRAM(S): 225 CAPSULE ORAL at 11:01

## 2022-08-06 RX ADMIN — GABAPENTIN 100 MILLIGRAM(S): 400 CAPSULE ORAL at 21:55

## 2022-08-06 RX ADMIN — LIDOCAINE 1 PATCH: 4 CREAM TOPICAL at 00:00

## 2022-08-06 RX ADMIN — SODIUM CHLORIDE 30 MILLILITER(S): 9 INJECTION, SOLUTION INTRAVENOUS at 08:00

## 2022-08-06 RX ADMIN — Medication 25 GRAM(S): at 14:20

## 2022-08-06 RX ADMIN — ALBUTEROL 2.5 MILLIGRAM(S): 90 AEROSOL, METERED ORAL at 22:17

## 2022-08-06 RX ADMIN — Medication 975 MILLIGRAM(S): at 00:22

## 2022-08-06 RX ADMIN — SENNA PLUS 2 TABLET(S): 8.6 TABLET ORAL at 21:55

## 2022-08-06 NOTE — PROGRESS NOTE ADULT - SUBJECTIVE AND OBJECTIVE BOX
CHIEF COMPLAINT: FOLLOW UP IN ICU FOR POSTOPERATIVE CARE OF PATIENT WHO IS S/P LUNG RESECTION            PROCEDURES:    - left uniportal VATS, CHALINO wedge then completion LULobectomy 05-Aug-2022         ISSUES:     Atelectasis   Lung mass   Postoperative pain   Chest tube in place   HTN    DM2   Hx of opiate abuse    BPH         INTERVAL EVENTS:    Chest tube with no airleak  CXR improved after bronchoscopy yesterday    HISTORY:    Patient reports moderate pain at chest wall incision sites which is worse with coughing and deep breathing without associated fever or dyspnea. Pain is improved with use of pain meds.           PHYSICAL EXAM:      Gen: Comfortable, No acute distress     Eyes: Sclera white, Conjunctiva normal, Eyelids normal, Pupils symmetrical      ENT: Mucous membranes moist,  ,  ,       Neck: Trachea midline,  ,  ,  ,  ,  ,       CV: Rate regular, Rhythm regular,  ,  ,       Resp: Breath sounds clear, No accessory muscles use, L. chest tube in place,  ,       Abd: Soft, Non-distended, Non-tender, Bowel sounds normal,  ,  ,       Skin: Warm, No peripheral edema of lower extremities,  ,       : No watts     Neuro: Moving all 4 extremities,       Psych: A&Ox3               ASSESSMENT AND PLAN:           NEURO:     Post-operative Pain - Stable. Pain control with oxycodone and Tylenol IV PRN and gabapentin and dilaudid IV PRN.            RESPIRATORY:   Hypoxia - Wean nasal cannula for goal O2sat above 92. Obtain CXR. Incentive spirometry. Chest PT and frequent suctioning. Continue bronchodilators. OOB to chair & ambulate w/ assistance. Continuous pulse oximetry for support & to prevent decompensation.       Chest tube – Pleurevac regulated water seal. Monitor chest tube output.          CARDIOVASCULAR:     Hemodynamically stable - Not on pressors. Continue hemodynamic monitoring.     Telemetry (medical test) - Reviewed by me today independently. Normal sinus rhythm.     HTN - stable. continue enalapril.        RENAL:     Stable - Monitor IOs and electrolytes. Keep K above 4.0 and Mg above 2.0.     BPH - stable. continue floxmax and finasteride.       GASTROINTESTINAL:     GI prophylaxis not indicated     Zofran and Reglan IV PRN for nausea     Regular consistency diet           HEMATOLOGIC:     No signs of active bleeding. Monitor Hgb in CBC in AM     DVT prophylaxis with heparin subQ and SCDs.             INFECTIOUS DISEASE:     All surgical sites appear clean. No signs of active infection. Will monitor for fever and leukocytosis.           ENDOCRINE:     Stable – Monitor glucose fingersticks for goal 120-180.           ONCOLOGY:     Lung mass - Improved. S/P resection. Follow up final pathology.          Pertinent clinical, laboratory, radiographic, hemodynamic, echocardiographic, respiratory data, microbiologic data and chart were reviewed by myself and analyzed frequently throughout the course of the day and night by myself.     Plan discussed at length with the CTICU staff and Attending CT Surgeon -   Dr Yash De Oliveira     Patient's status was discussed with patient at bedside.       _________________________  VITAL SIGNS:  Vital Signs Last 24 Hrs  T(C): 36.8 (06 Aug 2022 12:00), Max: 37.1 (06 Aug 2022 00:00)  T(F): 98.2 (06 Aug 2022 12:00), Max: 98.8 (06 Aug 2022 00:00)  HR: 94 (06 Aug 2022 11:00) (70 - 110)  BP: 139/86 (06 Aug 2022 11:00) (126/90 - 167/102)  BP(mean): 103 (06 Aug 2022 11:00) (94 - 122)  RR: 20 (06 Aug 2022 11:00) (13 - 29)  SpO2: 96% (06 Aug 2022 11:00) (90% - 100%)    Parameters below as of 06 Aug 2022 12:00  Patient On (Oxygen Delivery Method): nasal cannula w/ humidification  O2 Flow (L/min): 2    I/Os:   I&O's Detail    05 Aug 2022 07:01  -  06 Aug 2022 07:00  --------------------------------------------------------  IN:    IV PiggyBack: 50 mL    Lactated Ringers: 600 mL    Oral Fluid: 100 mL  Total IN: 750 mL    OUT:    Chest Tube (mL): 340 mL    Voided (mL): 815 mL  Total OUT: 1155 mL    Total NET: -405 mL      06 Aug 2022 07:01  -  06 Aug 2022 12:12  --------------------------------------------------------  IN:    IV PiggyBack: 100 mL    Lactated Ringers: 150 mL  Total IN: 250 mL    OUT:    Chest Tube (mL): 50 mL    Voided (mL): 200 mL  Total OUT: 250 mL    Total NET: 0 mL              MEDICATIONS:  MEDICATIONS  (STANDING):  acetaminophen   IVPB .. 1000 milliGRAM(s) IV Intermittent every 6 hours  acetaminophen   IVPB .. 1000 milliGRAM(s) IV Intermittent once  ALBUTerol    0.083% 2.5 milliGRAM(s) Nebulizer every 6 hours  atorvastatin 10 milliGRAM(s) Oral at bedtime  cyanocobalamin 1000 MICROGram(s) Oral daily  dornase carlton Solution 2.5 milliGRAM(s) Inhalation every 12 hours  enalapril 20 milliGRAM(s) Oral daily  finasteride 5 milliGRAM(s) Oral daily  gabapentin 100 milliGRAM(s) Oral every 8 hours  heparin   Injectable 5000 Unit(s) SubCutaneous every 8 hours  insulin lispro (ADMELOG) corrective regimen sliding scale   SubCutaneous three times a day before meals  insulin lispro (ADMELOG) corrective regimen sliding scale   SubCutaneous at bedtime  lactated ringers. 1000 milliLiter(s) (30 mL/Hr) IV Continuous <Continuous>  lidocaine   4% Patch 1 Patch Transdermal daily  polyethylene glycol 3350 17 Gram(s) Oral daily  senna 2 Tablet(s) Oral at bedtime  sodium chloride 3%  Inhalation 4 milliLiter(s) Inhalation every 6 hours  tamsulosin 0.4 milliGRAM(s) Oral daily    MEDICATIONS  (PRN):  HYDROmorphone  Injectable 0.5 milliGRAM(s) IV Push every 3 hours PRN Severe breakthrough Pain (7 - 10)  ketorolac   Injectable 15 milliGRAM(s) IV Push every 8 hours PRN pain not controlled by PCA, acetaminophen  naloxone Injectable 0.1 milliGRAM(s) IV Push every 3 minutes PRN For ANY of the following changes in patient status:  A. RR LESS THAN 10 breaths per minute, B. Oxygen saturation LESS THAN 90%, C. Sedation score of 6  ondansetron Injectable 4 milliGRAM(s) IV Push every 6 hours PRN Nausea  oxyCODONE    IR 5 milliGRAM(s) Oral every 3 hours PRN Moderate Pain (4 - 6)  oxyCODONE    IR 10 milliGRAM(s) Oral every 3 hours PRN Severe Pain (7 - 10)      LABS:  Laboratory data was independently reviewed by me today.                           12.9   14.10 )-----------( 286      ( 06 Aug 2022 05:00 )             40.2     08-06    136  |  101  |  16  ----------------------------<  126<H>  4.1   |  22  |  0.89    Ca    9.0      06 Aug 2022 05:00  Mg     1.80     08-06                RADIOLOGY:   Radiology images were independently reviewed by me today. Reports were reviewed by me today.    Xray Chest 1 View- PORTABLE-Urgent:   ACC: [id number] EXAM:  XR CHEST PORTABLE URGENT 1V                        ACC: [id number] EXAM:  XR CHEST PORTABLE URGENT 1V                          PROCEDURE DATE:  08/05/2022          INTERPRETATION:  EXAMINATION: XR CHEST URGENT, XR CHEST URGENT    CLINICAL INDICATION: Hypoxia. Status post left upper lobe lobectomy and   bronchoscopy.    TECHNIQUE: Single frontal, portable view of the chest was obtained.    COMPARISON: Chest x-ray from 8/5/2022 at 11:00 AM.    FINDINGS:  8/5/2022 at 4:05 PM:  Left-sided chest tube in place. Surgical clips overlie the left upper   lung field.  The heart is not accurately assessed in this projection.  Right middle lung field linear opacity likely represents subsegmental   atelectasis. Opacification of the left middle to lower lung with leftward   tracheal deviation may represent left lobar collapse.  No pneumothorax.  No acute osseous abnormalities.    8/5/2022 at 5:50 PM:  Left-sided chest tube in place. Surgical clips overlie the left upper   lung field.  Right middle lung field subsegmental atelectasis. Interval improvement in   left lung opacification with remaining patchy airspace opacities.  No pneumothorax.    IMPRESSION:  Interval improvement of left lung opacification likely represents   re-aeration of the collapsed left lower lobe.  Right middle lung subsegmental atelectasis.    --- End of Report ---          MYRA BLACKMON MD; Resident Radiologist  This document has been electronically signed.  BETO RODRIGUEZ MD; Attending Radiologist  This document has been electronically signed. Aug  6 2022 10:56AM (08-05-22 @ 18:06)  Xray Chest 1 View- PORTABLE-Urgent:   ACC: [id number] EXAM:  XR CHEST PORTABLE URGENT 1V                        ACC: [id number] EXAM:  XR CHEST PORTABLE URGENT 1V                          PROCEDURE DATE:  08/05/2022          INTERPRETATION:  EXAMINATION: XR CHEST URGENT, XR CHEST URGENT    CLINICAL INDICATION: Hypoxia. Status post left upper lobe lobectomy and   bronchoscopy.    TECHNIQUE: Single frontal, portable view of the chest was obtained.    COMPARISON: Chest x-ray from 8/5/2022 at 11:00 AM.    FINDINGS:  8/5/2022 at 4:05 PM:  Left-sided chest tube in place. Surgical clips overlie the left upper   lung field.  The heart is not accurately assessed in this projection.  Right middle lung field linear opacity likely represents subsegmental   atelectasis. Opacification of the left middle to lower lung with leftward   tracheal deviation may represent left lobar collapse.  No pneumothorax.  No acute osseous abnormalities.    8/5/2022 at 5:50 PM:  Left-sided chest tube in place. Surgical clips overlie the left upper   lung field.  Right middle lung field subsegmental atelectasis. Interval improvement in   left lung opacification with remaining patchy airspace opacities.  No pneumothorax.    IMPRESSION:  Interval improvement of left lung opacification likely represents   re-aeration of the collapsed left lower lobe.  Right middle lung subsegmental atelectasis.    --- End of Report ---          MYRA BLACKMON MD; Resident Radiologist  This document has been electronically signed.  BETO RODRIGUEZ MD; Attending Radiologist  This document has been electronically signed. Aug  6 2022 10:56AM (08-05-22 @ 16:22)  Xray Chest 1 View- PORTABLE-Urgent:   ACC: [id number] EXAM:  XR CHEST PORTABLE URGENT 1V                          PROCEDURE DATE:  08/05/2022          INTERPRETATION:  INDICATION: Status post LVATS for a lung mass.    COMPARISON: CT chest 5/5/2022.    FINDINGS:  Lines/Devices: Left chesttube.  Heart/Vascular: Heart size cannot be accurately assessed on this limited   portable projection.  Pulmonary: Bilateral linear opacities consistent with atelectasis. Left   midlung surgical clips. No pleural effusion or pneumothorax.  Bones: No acute findings.    Impression: Status post left thoracotomy with chest tube.          --- End of Report ---          EDWIGE PEREZ MD; Resident Radiology  This document has been electronically signed.  ELPIDIO CASEY MD; Attending Radiologist  This document has been electronically signed. Aug  5 2022 12:15PM (08-05-22 @ 11:19)

## 2022-08-06 NOTE — PATIENT PROFILE ADULT - NSPRONUTRITIONRISK_GEN_A_NUR
Chonodrocutaneous Helical Advancement Flap Text: The defect edges were debeveled with a #15 scalpel blade.  Given the location of the defect and the proximity to free margins a chondrocutaneous helical advancement flap was deemed most appropriate.  Using a sterile surgical marker, the appropriate advancement flap was drawn incorporating the defect and placing the expected incisions within the relaxed skin tension lines where possible.    The area thus outlined was incised deep to adipose tissue with a #15 scalpel blade.  The skin margins were undermined to an appropriate distance in all directions utilizing iris scissors. No indicators present

## 2022-08-06 NOTE — PROGRESS NOTE ADULT - SUBJECTIVE AND OBJECTIVE BOX
Anesthesia Pain Management Service    SUBJECTIVE: Patient is still complaining of pain despite being on the IV PCA.    Pain Scale Score	At rest: _6/10 	With Activity: ___ 	[X ] Refer to charted pain scores    THERAPY:    [ ] IV PCA Morphine		[ ] 5 mg/mL	[ ] 1 mg/mL  [X ] IV PCA Hydromorphone	[ ] 5 mg/mL	[X ] 1 mg/mL  [ ] IV PCA Fentanyl		[ ] 50 micrograms/mL    Demand dose __0.2_ lockout __6_ (minutes) Continuous Rate _0__ Total: _6.6__   mg used (in past 24 hrs)      MEDICATIONS  (STANDING):  acetaminophen   IVPB .. 1000 milliGRAM(s) IV Intermittent every 6 hours  acetaminophen   IVPB .. 1000 milliGRAM(s) IV Intermittent once  ALBUTerol    0.083% 2.5 milliGRAM(s) Nebulizer every 6 hours  atorvastatin 10 milliGRAM(s) Oral at bedtime  cyanocobalamin 1000 MICROGram(s) Oral daily  dornase carlton Solution 2.5 milliGRAM(s) Inhalation every 12 hours  enalapril 20 milliGRAM(s) Oral daily  finasteride 5 milliGRAM(s) Oral daily  gabapentin 100 milliGRAM(s) Oral every 8 hours  heparin   Injectable 5000 Unit(s) SubCutaneous every 8 hours  insulin lispro (ADMELOG) corrective regimen sliding scale   SubCutaneous three times a day before meals  insulin lispro (ADMELOG) corrective regimen sliding scale   SubCutaneous at bedtime  lactated ringers. 1000 milliLiter(s) (30 mL/Hr) IV Continuous <Continuous>  lidocaine   4% Patch 1 Patch Transdermal daily  polyethylene glycol 3350 17 Gram(s) Oral daily  senna 2 Tablet(s) Oral at bedtime  sodium chloride 3%  Inhalation 4 milliLiter(s) Inhalation every 6 hours  tamsulosin 0.4 milliGRAM(s) Oral daily    MEDICATIONS  (PRN):  HYDROmorphone  Injectable 0.5 milliGRAM(s) IV Push every 3 hours PRN Severe breakthrough Pain (7 - 10)  ketorolac   Injectable 15 milliGRAM(s) IV Push every 8 hours PRN pain not controlled by PCA, acetaminophen  naloxone Injectable 0.1 milliGRAM(s) IV Push every 3 minutes PRN For ANY of the following changes in patient status:  A. RR LESS THAN 10 breaths per minute, B. Oxygen saturation LESS THAN 90%, C. Sedation score of 6  ondansetron Injectable 4 milliGRAM(s) IV Push every 6 hours PRN Nausea  oxyCODONE    IR 5 milliGRAM(s) Oral every 3 hours PRN Moderate Pain (4 - 6)  oxyCODONE    IR 10 milliGRAM(s) Oral every 3 hours PRN Severe Pain (7 - 10)      OBJECTIVE:  Patient is sitting up in chair with CTx1.    Sedation Score:	[ X] Alert	[ ] Drowsy 	[ ] Arousable	[ ] Asleep	[ ] Unresponsive    Side Effects:	[X ] None	[ ] Nausea	[ ] Vomiting	[ ] Pruritus  		[ ] Other:    Vital Signs Last 24 Hrs  T(C): 36.8 (06 Aug 2022 12:00), Max: 37.1 (06 Aug 2022 00:00)  T(F): 98.2 (06 Aug 2022 12:00), Max: 98.8 (06 Aug 2022 00:00)  HR: 95 (06 Aug 2022 13:00) (70 - 110)  BP: 129/86 (06 Aug 2022 12:00) (126/90 - 167/102)  BP(mean): 95 (06 Aug 2022 12:00) (94 - 122)  RR: 20 (06 Aug 2022 13:00) (16 - 29)  SpO2: 94% (06 Aug 2022 13:00) (90% - 100%)    Parameters below as of 06 Aug 2022 13:00  Patient On (Oxygen Delivery Method): nasal cannula w/ humidification  O2 Flow (L/min): 2      ASSESSMENT/ PLAN    Therapy to  be:	[ ] Continue   [ X] Discontinued   [X ] Change to prn Analgesics    Documentation and Verification of current medications:   [X] Done	[ ] Not done, not elligible    Comments: Discussed patient with team and ok to discontinue IV Dilaudid PCA. Instead will start patient on  PRN Oral/IV opioids and/or Adjuvant non-opioid medication to be ordered at this point.    Progress Note written now but Patient was seen earlier.

## 2022-08-07 DIAGNOSIS — E05.90 THYROTOXICOSIS, UNSPECIFIED WITHOUT THYROTOXIC CRISIS OR STORM: ICD-10-CM

## 2022-08-07 LAB
ANION GAP SERPL CALC-SCNC: 11 MMOL/L — SIGNIFICANT CHANGE UP (ref 7–14)
BUN SERPL-MCNC: 18 MG/DL — SIGNIFICANT CHANGE UP (ref 7–23)
CALCIUM SERPL-MCNC: 9.1 MG/DL — SIGNIFICANT CHANGE UP (ref 8.4–10.5)
CHLORIDE SERPL-SCNC: 100 MMOL/L — SIGNIFICANT CHANGE UP (ref 98–107)
CO2 SERPL-SCNC: 24 MMOL/L — SIGNIFICANT CHANGE UP (ref 22–31)
CREAT SERPL-MCNC: 1.03 MG/DL — SIGNIFICANT CHANGE UP (ref 0.5–1.3)
EGFR: 81 ML/MIN/1.73M2 — SIGNIFICANT CHANGE UP
GLUCOSE BLDC GLUCOMTR-MCNC: 101 MG/DL — HIGH (ref 70–99)
GLUCOSE BLDC GLUCOMTR-MCNC: 108 MG/DL — HIGH (ref 70–99)
GLUCOSE BLDC GLUCOMTR-MCNC: 110 MG/DL — HIGH (ref 70–99)
GLUCOSE SERPL-MCNC: 108 MG/DL — HIGH (ref 70–99)
HCT VFR BLD CALC: 39.9 % — SIGNIFICANT CHANGE UP (ref 39–50)
HGB BLD-MCNC: 12.1 G/DL — LOW (ref 13–17)
MAGNESIUM SERPL-MCNC: 2.3 MG/DL — SIGNIFICANT CHANGE UP (ref 1.6–2.6)
MCHC RBC-ENTMCNC: 29.7 PG — SIGNIFICANT CHANGE UP (ref 27–34)
MCHC RBC-ENTMCNC: 30.3 GM/DL — LOW (ref 32–36)
MCV RBC AUTO: 98 FL — SIGNIFICANT CHANGE UP (ref 80–100)
NRBC # BLD: 0 /100 WBCS — SIGNIFICANT CHANGE UP
NRBC # FLD: 0 K/UL — SIGNIFICANT CHANGE UP
PHOSPHATE SERPL-MCNC: 2.7 MG/DL — SIGNIFICANT CHANGE UP (ref 2.5–4.5)
PLATELET # BLD AUTO: 254 K/UL — SIGNIFICANT CHANGE UP (ref 150–400)
POTASSIUM SERPL-MCNC: 4.6 MMOL/L — SIGNIFICANT CHANGE UP (ref 3.5–5.3)
POTASSIUM SERPL-SCNC: 4.6 MMOL/L — SIGNIFICANT CHANGE UP (ref 3.5–5.3)
RBC # BLD: 4.07 M/UL — LOW (ref 4.2–5.8)
RBC # FLD: 13.4 % — SIGNIFICANT CHANGE UP (ref 10.3–14.5)
SODIUM SERPL-SCNC: 135 MMOL/L — SIGNIFICANT CHANGE UP (ref 135–145)
T4 FREE SERPL-MCNC: 1.5 NG/DL — SIGNIFICANT CHANGE UP (ref 0.9–1.8)
TSH SERPL-MCNC: 0.23 UIU/ML — LOW (ref 0.27–4.2)
WBC # BLD: 12.14 K/UL — HIGH (ref 3.8–10.5)
WBC # FLD AUTO: 12.14 K/UL — HIGH (ref 3.8–10.5)

## 2022-08-07 PROCEDURE — 99233 SBSQ HOSP IP/OBS HIGH 50: CPT

## 2022-08-07 PROCEDURE — 71045 X-RAY EXAM CHEST 1 VIEW: CPT | Mod: 26,76

## 2022-08-07 RX ORDER — LATANOPROST 0.05 MG/ML
1 SOLUTION/ DROPS OPHTHALMIC; TOPICAL DAILY
Refills: 0 | Status: DISCONTINUED | OUTPATIENT
Start: 2022-08-07 | End: 2022-08-08

## 2022-08-07 RX ORDER — METOPROLOL TARTRATE 50 MG
12.5 TABLET ORAL EVERY 8 HOURS
Refills: 0 | Status: DISCONTINUED | OUTPATIENT
Start: 2022-08-07 | End: 2022-08-08

## 2022-08-07 RX ORDER — POLYETHYLENE GLYCOL 3350 17 G/17G
17 POWDER, FOR SOLUTION ORAL EVERY 12 HOURS
Refills: 0 | Status: DISCONTINUED | OUTPATIENT
Start: 2022-08-07 | End: 2022-08-08

## 2022-08-07 RX ORDER — LIDOCAINE HCL 20 MG/ML
4 VIAL (ML) INJECTION ONCE
Refills: 0 | Status: COMPLETED | OUTPATIENT
Start: 2022-08-07 | End: 2022-08-07

## 2022-08-07 RX ORDER — LIDOCAINE HCL 20 MG/ML
10 VIAL (ML) INJECTION ONCE
Refills: 0 | Status: COMPLETED | OUTPATIENT
Start: 2022-08-07 | End: 2022-08-07

## 2022-08-07 RX ORDER — SODIUM CHLORIDE 9 MG/ML
250 INJECTION, SOLUTION INTRAVENOUS ONCE
Refills: 0 | Status: COMPLETED | OUTPATIENT
Start: 2022-08-07 | End: 2022-08-07

## 2022-08-07 RX ORDER — SIMETHICONE 80 MG/1
80 TABLET, CHEWABLE ORAL EVERY 8 HOURS
Refills: 0 | Status: DISCONTINUED | OUTPATIENT
Start: 2022-08-07 | End: 2022-08-08

## 2022-08-07 RX ORDER — LEVALBUTEROL 1.25 MG/.5ML
0.63 SOLUTION, CONCENTRATE RESPIRATORY (INHALATION) EVERY 6 HOURS
Refills: 0 | Status: DISCONTINUED | OUTPATIENT
Start: 2022-08-07 | End: 2022-08-08

## 2022-08-07 RX ADMIN — Medication 15 MILLIGRAM(S): at 08:00

## 2022-08-07 RX ADMIN — OXYCODONE HYDROCHLORIDE 10 MILLIGRAM(S): 5 TABLET ORAL at 19:00

## 2022-08-07 RX ADMIN — GABAPENTIN 100 MILLIGRAM(S): 400 CAPSULE ORAL at 13:14

## 2022-08-07 RX ADMIN — SODIUM CHLORIDE 4 MILLILITER(S): 9 INJECTION INTRAMUSCULAR; INTRAVENOUS; SUBCUTANEOUS at 15:33

## 2022-08-07 RX ADMIN — SODIUM CHLORIDE 500 MILLILITER(S): 9 INJECTION, SOLUTION INTRAVENOUS at 15:30

## 2022-08-07 RX ADMIN — ALBUTEROL 2.5 MILLIGRAM(S): 90 AEROSOL, METERED ORAL at 04:24

## 2022-08-07 RX ADMIN — ALBUTEROL 2.5 MILLIGRAM(S): 90 AEROSOL, METERED ORAL at 09:23

## 2022-08-07 RX ADMIN — LIDOCAINE 1 PATCH: 4 CREAM TOPICAL at 11:20

## 2022-08-07 RX ADMIN — SIMETHICONE 80 MILLIGRAM(S): 80 TABLET, CHEWABLE ORAL at 22:42

## 2022-08-07 RX ADMIN — Medication 1000 MILLIGRAM(S): at 01:54

## 2022-08-07 RX ADMIN — Medication 12.5 MILLIGRAM(S): at 22:42

## 2022-08-07 RX ADMIN — SODIUM CHLORIDE 4 MILLILITER(S): 9 INJECTION INTRAMUSCULAR; INTRAVENOUS; SUBCUTANEOUS at 09:23

## 2022-08-07 RX ADMIN — ATORVASTATIN CALCIUM 10 MILLIGRAM(S): 80 TABLET, FILM COATED ORAL at 22:42

## 2022-08-07 RX ADMIN — HEPARIN SODIUM 5000 UNIT(S): 5000 INJECTION INTRAVENOUS; SUBCUTANEOUS at 06:00

## 2022-08-07 RX ADMIN — OXYCODONE HYDROCHLORIDE 5 MILLIGRAM(S): 5 TABLET ORAL at 05:13

## 2022-08-07 RX ADMIN — GABAPENTIN 100 MILLIGRAM(S): 400 CAPSULE ORAL at 22:43

## 2022-08-07 RX ADMIN — LIDOCAINE 1 PATCH: 4 CREAM TOPICAL at 18:36

## 2022-08-07 RX ADMIN — HEPARIN SODIUM 5000 UNIT(S): 5000 INJECTION INTRAVENOUS; SUBCUTANEOUS at 22:43

## 2022-08-07 RX ADMIN — OXYCODONE HYDROCHLORIDE 5 MILLIGRAM(S): 5 TABLET ORAL at 06:13

## 2022-08-07 RX ADMIN — LATANOPROST 1 DROP(S): 0.05 SOLUTION/ DROPS OPHTHALMIC; TOPICAL at 11:22

## 2022-08-07 RX ADMIN — Medication 400 MILLIGRAM(S): at 05:14

## 2022-08-07 RX ADMIN — OXYCODONE HYDROCHLORIDE 10 MILLIGRAM(S): 5 TABLET ORAL at 18:30

## 2022-08-07 RX ADMIN — SODIUM CHLORIDE 4 MILLILITER(S): 9 INJECTION INTRAMUSCULAR; INTRAVENOUS; SUBCUTANEOUS at 04:24

## 2022-08-07 RX ADMIN — POLYETHYLENE GLYCOL 3350 17 GRAM(S): 17 POWDER, FOR SOLUTION ORAL at 18:36

## 2022-08-07 RX ADMIN — DORNASE ALFA 2.5 MILLIGRAM(S): 1 SOLUTION RESPIRATORY (INHALATION) at 09:23

## 2022-08-07 RX ADMIN — SENNA PLUS 2 TABLET(S): 8.6 TABLET ORAL at 22:43

## 2022-08-07 RX ADMIN — Medication 400 MILLIGRAM(S): at 00:54

## 2022-08-07 RX ADMIN — Medication 20 MILLIGRAM(S): at 05:35

## 2022-08-07 RX ADMIN — LIDOCAINE 1 PATCH: 4 CREAM TOPICAL at 23:04

## 2022-08-07 RX ADMIN — HEPARIN SODIUM 5000 UNIT(S): 5000 INJECTION INTRAVENOUS; SUBCUTANEOUS at 13:14

## 2022-08-07 RX ADMIN — SODIUM CHLORIDE 500 MILLILITER(S): 9 INJECTION, SOLUTION INTRAVENOUS at 17:51

## 2022-08-07 RX ADMIN — ALBUTEROL 2.5 MILLIGRAM(S): 90 AEROSOL, METERED ORAL at 15:33

## 2022-08-07 RX ADMIN — PREGABALIN 1000 MICROGRAM(S): 225 CAPSULE ORAL at 11:21

## 2022-08-07 RX ADMIN — TAMSULOSIN HYDROCHLORIDE 0.4 MILLIGRAM(S): 0.4 CAPSULE ORAL at 11:21

## 2022-08-07 RX ADMIN — SIMETHICONE 80 MILLIGRAM(S): 80 TABLET, CHEWABLE ORAL at 09:12

## 2022-08-07 RX ADMIN — LEVALBUTEROL 0.63 MILLIGRAM(S): 1.25 SOLUTION, CONCENTRATE RESPIRATORY (INHALATION) at 21:12

## 2022-08-07 RX ADMIN — GABAPENTIN 100 MILLIGRAM(S): 400 CAPSULE ORAL at 05:13

## 2022-08-07 RX ADMIN — FINASTERIDE 5 MILLIGRAM(S): 5 TABLET, FILM COATED ORAL at 11:21

## 2022-08-07 RX ADMIN — Medication 15 MILLIGRAM(S): at 07:40

## 2022-08-07 RX ADMIN — Medication 1000 MILLIGRAM(S): at 07:57

## 2022-08-07 RX ADMIN — SODIUM CHLORIDE 4 MILLILITER(S): 9 INJECTION INTRAMUSCULAR; INTRAVENOUS; SUBCUTANEOUS at 21:13

## 2022-08-07 RX ADMIN — DORNASE ALFA 2.5 MILLIGRAM(S): 1 SOLUTION RESPIRATORY (INHALATION) at 21:13

## 2022-08-07 NOTE — PROGRESS NOTE ADULT - SUBJECTIVE AND OBJECTIVE BOX
CHIEF COMPLAINT: FOLLOW UP IN ICU FOR POSTOPERATIVE CARE OF PATIENT WHO IS S/P LUNG RESECTION            PROCEDURES:    - left uniportal VATS, CHALINO wedge then completion LULobectomy 05-Aug-2022         ISSUES:     Atelectasis   Lung mass   Postoperative pain   Chest tube in place   HTN    DM2   Hx of opiate abuse    BPH         INTERVAL EVENTS:    Chest tube with no airleak, 270ml output in past 24h  Collapsed left lung again however improved with chest PT.  Spo2 maintained on 2l NC, afebrile, pain controlled on oral oxycodone/toradol/tylenol  Walking in the ICU  Complains of constipation, started on laxatives      HISTORY:    Patient reports moderate pain at chest wall incision sites which is worse with coughing and deep breathing without associated fever or dyspnea. Pain is improved with use of pain meds.           PHYSICAL EXAM:      Gen: Comfortable, No acute distress     Eyes: Sclera white, Conjunctiva normal, Eyelids normal, Pupils symmetrical      ENT: Mucous membranes moist,  ,  ,       Neck: Trachea midline,  ,  ,  ,  ,  ,       CV: Rate regular, Rhythm regular,  ,  ,       Resp: Breath sounds clear, No accessory muscles use, L. chest tube in place,  ,       Abd: Soft, Non-distended, Non-tender, Bowel sounds normal,  ,  ,       Skin: Warm, No peripheral edema of lower extremities,  ,       : No watts     Neuro: Moving all 4 extremities,       Psych: A&Ox3               ASSESSMENT AND PLAN:           NEURO:     Post-operative Pain - Stable. Pain control with oxycodone and Tylenol IV PRN and gabapentin and dilaudid IV PRN.            RESPIRATORY:   Hypoxia - Wean nasal cannula for goal O2sat above 92. Obtain CXR. Incentive spirometry. Chest PT and frequent suctioning. Continue bronchodilators. OOB to chair & ambulate w/ assistance. Continuous pulse oximetry for support & to prevent decompensation.       Chest tube – Pleurevac regulated water seal. Monitor chest tube output.          CARDIOVASCULAR:     Hemodynamically stable - Not on pressors. Continue hemodynamic monitoring.     Telemetry (medical test) - Reviewed by me today independently. Normal sinus rhythm.     HTN - stable. continue enalapril.        RENAL:     Stable - Monitor IOs and electrolytes. Keep K above 4.0 and Mg above 2.0.     BPH - stable. continue floxmax and finasteride.           GASTROINTESTINAL:     GI prophylaxis not indicated     Zofran and Reglan IV PRN for nausea     Regular consistency diet           HEMATOLOGIC:     No signs of active bleeding. Monitor Hgb in CBC in AM     DVT prophylaxis with heparin subQ and SCDs.             INFECTIOUS DISEASE:     All surgical sites appear clean. No signs of active infection. Will monitor for fever and leukocytosis.           ENDOCRINE:     Stable – Monitor glucose fingersticks for goal 120-180.           ONCOLOGY:     Lung mass - Improved. S/P resection. Follow up final pathology.          Pertinent clinical, laboratory, radiographic, hemodynamic, echocardiographic, respiratory data, microbiologic data and chart were reviewed by myself and analyzed frequently throughout the course of the day and night by myself.     Plan discussed at length with the CTICU staff and Attending CT Surgeon -   Dr Yash De Oliveira     Patient's status was discussed with patient at bedside.     _________________________  VITAL SIGNS:  Vital Signs Last 24 Hrs  T(C): 36.8 (07 Aug 2022 08:00), Max: 37.1 (06 Aug 2022 20:00)  T(F): 98.3 (07 Aug 2022 08:00), Max: 98.8 (06 Aug 2022 20:00)  HR: 94 (07 Aug 2022 10:00) (73 - 117)  BP: 119/82 (07 Aug 2022 10:00) (113/75 - 148/88)  BP(mean): 94 (07 Aug 2022 10:00) (78 - 114)  RR: 14 (07 Aug 2022 10:00) (14 - 29)  SpO2: 95% (07 Aug 2022 10:00) (94% - 100%)    Parameters below as of 07 Aug 2022 10:00  Patient On (Oxygen Delivery Method): room air      I/Os:   I&O's Detail    06 Aug 2022 07:01  -  07 Aug 2022 07:00  --------------------------------------------------------  IN:    IV PiggyBack: 250 mL    Lactated Ringers: 240 mL    Oral Fluid: 705 mL  Total IN: 1195 mL    OUT:    Chest Tube (mL): 270 mL    Voided (mL): 1050 mL  Total OUT: 1320 mL    Total NET: -125 mL      07 Aug 2022 07:01  -  07 Aug 2022 10:38  --------------------------------------------------------  IN:  Total IN: 0 mL    OUT:    Chest Tube (mL): 10 mL    Voided (mL): 200 mL  Total OUT: 210 mL    Total NET: -210 mL              MEDICATIONS:  MEDICATIONS  (STANDING):  acetaminophen   IVPB .. 1000 milliGRAM(s) IV Intermittent once  ALBUTerol    0.083% 2.5 milliGRAM(s) Nebulizer every 6 hours  atorvastatin 10 milliGRAM(s) Oral at bedtime  cyanocobalamin 1000 MICROGram(s) Oral daily  dornase carlton Solution 2.5 milliGRAM(s) Inhalation every 12 hours  enalapril 20 milliGRAM(s) Oral daily  finasteride 5 milliGRAM(s) Oral daily  gabapentin 100 milliGRAM(s) Oral every 8 hours  heparin   Injectable 5000 Unit(s) SubCutaneous every 8 hours  insulin lispro (ADMELOG) corrective regimen sliding scale   SubCutaneous three times a day before meals  insulin lispro (ADMELOG) corrective regimen sliding scale   SubCutaneous at bedtime  latanoprost 0.005% Ophthalmic Solution 1 Drop(s) Left EYE daily  lidocaine   4% Patch 1 Patch Transdermal daily  polyethylene glycol 3350 17 Gram(s) Oral every 12 hours  senna 2 Tablet(s) Oral at bedtime  simethicone 80 milliGRAM(s) Chew every 8 hours  sodium chloride 3%  Inhalation 4 milliLiter(s) Inhalation every 6 hours  tamsulosin 0.4 milliGRAM(s) Oral daily    MEDICATIONS  (PRN):  HYDROmorphone  Injectable 0.5 milliGRAM(s) IV Push every 3 hours PRN Severe breakthrough Pain (7 - 10)  ketorolac   Injectable 15 milliGRAM(s) IV Push every 8 hours PRN pain not controlled by PCA, acetaminophen  naloxone Injectable 0.1 milliGRAM(s) IV Push every 3 minutes PRN For ANY of the following changes in patient status:  A. RR LESS THAN 10 breaths per minute, B. Oxygen saturation LESS THAN 90%, C. Sedation score of 6  ondansetron Injectable 4 milliGRAM(s) IV Push every 6 hours PRN Nausea  oxyCODONE    IR 5 milliGRAM(s) Oral every 3 hours PRN Moderate Pain (4 - 6)  oxyCODONE    IR 10 milliGRAM(s) Oral every 3 hours PRN Severe Pain (7 - 10)      LABS:  Laboratory data was independently reviewed by me today.                           12.1   12.14 )-----------( 254      ( 07 Aug 2022 04:23 )             39.9     08-07    135  |  100  |  18  ----------------------------<  108<H>  4.6   |  24  |  1.03    Ca    9.1      07 Aug 2022 04:23  Phos  2.7     08-07  Mg     2.30     08-07                RADIOLOGY:   Radiology images were independently reviewed by me today. Reports were reviewed by me today.    Xray Chest 1 View AP/PA:   ACC: [id number] EXAM:  XR CHEST AP OR PA 1V                          PROCEDURE DATE:  08/06/2022          INTERPRETATION:  XR CHEST. One view.    INDICATION: s/p LVATS    COMPARISON: 8/5/2022    FINDINGS/  IMPRESSION:    Stable left chest tube and left basilar opacity. Mild atelectasis at the   right base. Small left pleural effusion. No pneumothorax.    --- End of Report ---            BETO RODRIGUEZ MD; Attending Radiologist  This document has been electronically signed. Aug  6 2022  1:41PM (08-06-22 @ 07:13)  Xray Chest 1 View- PORTABLE-Urgent:   ACC: [id number] EXAM:  XR CHEST PORTABLE URGENT 1V                        ACC: [id number] EXAM:  XR CHEST PORTABLE URGENT 1V                          PROCEDURE DATE:  08/05/2022          INTERPRETATION:  EXAMINATION: XR CHEST URGENT, XR CHEST URGENT    CLINICAL INDICATION: Hypoxia. Status post left upper lobe lobectomy and   bronchoscopy.    TECHNIQUE: Single frontal, portable view of the chest was obtained.    COMPARISON: Chest x-ray from 8/5/2022 at 11:00 AM.    FINDINGS:  8/5/2022 at 4:05 PM:  Left-sided chest tube in place. Surgical clips overlie the left upper   lung field.  The heart is not accurately assessed in this projection.  Right middle lung field linear opacity likely represents subsegmental   atelectasis. Opacification of the left middle to lower lung with leftward   tracheal deviation may represent left lobar collapse.  No pneumothorax.  No acute osseous abnormalities.    8/5/2022 at 5:50 PM:  Left-sided chest tube in place. Surgical clips overlie the left upper   lung field.  Right middle lung field subsegmental atelectasis. Interval improvement in   left lung opacification with remaining patchy airspace opacities.  No pneumothorax.    IMPRESSION:  Interval improvement of left lung opacification likely represents   re-aeration of the collapsed left lower lobe.  Right middle lung subsegmental atelectasis.    --- End of Report ---          MYRA BLACKMON MD; Resident Radiologist  This document has been electronically signed.  BETO RODRIGUEZ MD; Attending Radiologist  This document has been electronically signed. Aug  6 2022 10:56AM (08-05-22 @ 18:06)  Xray Chest 1 View- PORTABLE-Urgent:   ACC: [id number] EXAM:  XR CHEST PORTABLE URGENT 1V                        ACC: [id number] EXAM:  XR CHEST PORTABLE URGENT 1V                          PROCEDURE DATE:  08/05/2022          INTERPRETATION:  EXAMINATION: XR CHEST URGENT, XR CHEST URGENT    CLINICAL INDICATION: Hypoxia. Status post left upper lobe lobectomy and   bronchoscopy.    TECHNIQUE: Single frontal, portable view of the chest was obtained.    COMPARISON: Chest x-ray from 8/5/2022 at 11:00 AM.    FINDINGS:  8/5/2022 at 4:05 PM:  Left-sided chest tube in place. Surgical clips overlie the left upper   lung field.  The heart is not accurately assessed in this projection.  Right middle lung field linear opacity likely represents subsegmental   atelectasis. Opacification of the left middle to lower lung with leftward   tracheal deviation may represent left lobar collapse.  No pneumothorax.  No acute osseous abnormalities.    8/5/2022 at 5:50 PM:  Left-sided chest tube in place. Surgical clips overlie the left upper   lung field.  Right middle lung field subsegmental atelectasis. Interval improvement in   left lung opacification with remaining patchy airspace opacities.  No pneumothorax.    IMPRESSION:  Interval improvement of left lung opacification likely represents   re-aeration of the collapsed left lower lobe.  Right middle lung subsegmental atelectasis.    --- End of Report ---          MYRA BLACKMON MD; Resident Radiologist  This document has been electronically signed.  BETO RODRIGUEZ MD; Attending Radiologist  This document has been electronically signed. Aug  6 2022 10:56AM (08-05-22 @ 16:22)

## 2022-08-07 NOTE — CONSULT NOTE ADULT - SUBJECTIVE AND OBJECTIVE BOX
Chief complaint    Patient is a 67y old  Male who presents with a chief complaint of LVATS, completion CHALINO lobectomy (07 Aug 2022 10:08)   Review of systems  Patient in bed, appears comfortable.    Labs and Fingersticks  CAPILLARY BLOOD GLUCOSE      POCT Blood Glucose.: 108 mg/dL (07 Aug 2022 11:30)  POCT Blood Glucose.: 133 mg/dL (06 Aug 2022 22:45)  POCT Blood Glucose.: 128 mg/dL (06 Aug 2022 15:28)      Anion Gap, Serum: 11 (08-07 @ 04:23)  Anion Gap, Serum: 13 (08-06 @ 05:00)      Calcium, Total Serum: 9.1 (08-07 @ 04:23)  Calcium, Total Serum: 9.0 (08-06 @ 05:00)          08-07    135  |  100  |  18  ----------------------------<  108<H>  4.6   |  24  |  1.03    Ca    9.1      07 Aug 2022 04:23  Phos  2.7     08-07  Mg     2.30     08-07                          12.1   12.14 )-----------( 254      ( 07 Aug 2022 04:23 )             39.9     Medications  MEDICATIONS  (STANDING):  acetaminophen   IVPB .. 1000 milliGRAM(s) IV Intermittent once  ALBUTerol    0.083% 2.5 milliGRAM(s) Nebulizer every 6 hours  atorvastatin 10 milliGRAM(s) Oral at bedtime  cyanocobalamin 1000 MICROGram(s) Oral daily  dornase carlton Solution 2.5 milliGRAM(s) Inhalation every 12 hours  enalapril 20 milliGRAM(s) Oral daily  finasteride 5 milliGRAM(s) Oral daily  gabapentin 100 milliGRAM(s) Oral every 8 hours  heparin   Injectable 5000 Unit(s) SubCutaneous every 8 hours  insulin lispro (ADMELOG) corrective regimen sliding scale   SubCutaneous three times a day before meals  insulin lispro (ADMELOG) corrective regimen sliding scale   SubCutaneous at bedtime  latanoprost 0.005% Ophthalmic Solution 1 Drop(s) Left EYE daily  lidocaine   4% Patch 1 Patch Transdermal daily  polyethylene glycol 3350 17 Gram(s) Oral every 12 hours  senna 2 Tablet(s) Oral at bedtime  simethicone 80 milliGRAM(s) Chew every 8 hours  sodium chloride 3%  Inhalation 4 milliLiter(s) Inhalation every 6 hours  tamsulosin 0.4 milliGRAM(s) Oral daily      Physical Exam  General: Patient comfortable in bed  Vital Signs Last 12 Hrs  T(F): 97.6 (08-07-22 @ 12:00), Max: 98.3 (08-07-22 @ 08:00)  HR: 95 (08-07-22 @ 12:00) (73 - 99)  BP: 127/91 (08-07-22 @ 12:00) (113/75 - 148/88)  BP(mean): 101 (08-07-22 @ 12:00) (87 - 107)  RR: 20 (08-07-22 @ 12:00) (14 - 23)  SpO2: 95% (08-07-22 @ 12:00) (95% - 100%)  Neck: No palpable thyroid nodules.  CVS: S1S2, No murmurs  Respiratory: No wheezing, no crepitations  GI: Abdomen soft, bowel sounds positive  Musculoskeletal:  edema lower extremities.     Diagnostics    Free Thyroxine, Serum: AM Sched. Collection: 07-Aug-2022 03:00 (08-06 @ 12:05)  Thyroid Stimulating Hormone, Serum: AM Sched. Collection: 07-Aug-2022 03:00 (08-06 @ 12:05)

## 2022-08-07 NOTE — CONSULT NOTE ADULT - ASSESSMENT
Assessment  DMT2: 67y Male with DM T2 with hyperglycemia admitted with lung ca, was on oral hypoglycemic agents at home, now on insulin coverage, blood sugars improving, no hypoglycemic episode, not eating meal.  Lung ca: Being worked up, monitored.  HTN: On antihypertensive medications, monitored, asymptomatic.  Subclinical Hyperthyroidism: Not on any supplements.             Joselin Mendez MD  Cell: [phone]  Office: [phone]            
 66 yo male known h/o DM 2 (on OHG agents), HTN, HLD, Hx  drug  abuser (heroin and cocaine ) quit 07/2021 noted lung nodule scheduled for  flexible bronchoscopy, left VATs, lung resection 8/5/22.   Pt. has a left lung mass.   No prior cardiac history denies chest pain dyspnea syncope is able to ambulate 4 blocks    Problem/Plan - 1:  ·  Problem: Mass of left lung.   ·  Plan: Pt. is scheduled for a flexible bronchoscopy, left VATs, lung resection 8/5/22.   Patient's Revised Cardiac Risk Index (RCRI) score is 0 (3.9 % risk). Patient is at intermediate  risk of  adverse perioperative cardiac events undergoing intermediate  risk surgery. No further cardiac testing is needed prior to patient's surgery.       Problem/Plan - 2:  ·  Problem: Type II diabetes mellitus.   ·  Plan: Instructed pt. no Metformin morning of surgery.    Problem/Plan - 3:  ·  Problem: Abnormal EKG.   ·  Plan: ECG c/w LVH with repolarization abnormalities no serial changes noted from prior tracings      Problem/Plan - 4:  ·  Problem: Elevated blood pressure reading with diagnosis of hypertension.   ·  Plan: Repeat /94.  Pt. is asymptomatic.    Continue Enalapril

## 2022-08-08 ENCOUNTER — TRANSCRIPTION ENCOUNTER (OUTPATIENT)
Age: 67
End: 2022-08-08

## 2022-08-08 VITALS
RESPIRATION RATE: 21 BRPM | OXYGEN SATURATION: 99 % | SYSTOLIC BLOOD PRESSURE: 136 MMHG | DIASTOLIC BLOOD PRESSURE: 90 MMHG | HEART RATE: 96 BPM

## 2022-08-08 LAB
ANION GAP SERPL CALC-SCNC: 10 MMOL/L — SIGNIFICANT CHANGE UP (ref 7–14)
BUN SERPL-MCNC: 14 MG/DL — SIGNIFICANT CHANGE UP (ref 7–23)
CALCIUM SERPL-MCNC: 8.8 MG/DL — SIGNIFICANT CHANGE UP (ref 8.4–10.5)
CHLORIDE SERPL-SCNC: 99 MMOL/L — SIGNIFICANT CHANGE UP (ref 98–107)
CO2 SERPL-SCNC: 24 MMOL/L — SIGNIFICANT CHANGE UP (ref 22–31)
CREAT SERPL-MCNC: 0.82 MG/DL — SIGNIFICANT CHANGE UP (ref 0.5–1.3)
EGFR: 97 ML/MIN/1.73M2 — SIGNIFICANT CHANGE UP
GLUCOSE BLDC GLUCOMTR-MCNC: 103 MG/DL — HIGH (ref 70–99)
GLUCOSE BLDC GLUCOMTR-MCNC: 106 MG/DL — HIGH (ref 70–99)
GLUCOSE BLDC GLUCOMTR-MCNC: 110 MG/DL — HIGH (ref 70–99)
GLUCOSE SERPL-MCNC: 117 MG/DL — HIGH (ref 70–99)
HCT VFR BLD CALC: 38.4 % — LOW (ref 39–50)
HGB BLD-MCNC: 12.1 G/DL — LOW (ref 13–17)
MAGNESIUM SERPL-MCNC: 2.1 MG/DL — SIGNIFICANT CHANGE UP (ref 1.6–2.6)
MCHC RBC-ENTMCNC: 30 PG — SIGNIFICANT CHANGE UP (ref 27–34)
MCHC RBC-ENTMCNC: 31.5 GM/DL — LOW (ref 32–36)
MCV RBC AUTO: 95 FL — SIGNIFICANT CHANGE UP (ref 80–100)
NRBC # BLD: 0 /100 WBCS — SIGNIFICANT CHANGE UP
NRBC # FLD: 0 K/UL — SIGNIFICANT CHANGE UP
PHOSPHATE SERPL-MCNC: 1.3 MG/DL — LOW (ref 2.5–4.5)
PLATELET # BLD AUTO: 264 K/UL — SIGNIFICANT CHANGE UP (ref 150–400)
POTASSIUM SERPL-MCNC: 3.8 MMOL/L — SIGNIFICANT CHANGE UP (ref 3.5–5.3)
POTASSIUM SERPL-SCNC: 3.8 MMOL/L — SIGNIFICANT CHANGE UP (ref 3.5–5.3)
RBC # BLD: 4.04 M/UL — LOW (ref 4.2–5.8)
RBC # FLD: 13 % — SIGNIFICANT CHANGE UP (ref 10.3–14.5)
SODIUM SERPL-SCNC: 133 MMOL/L — LOW (ref 135–145)
WBC # BLD: 10.45 K/UL — SIGNIFICANT CHANGE UP (ref 3.8–10.5)
WBC # FLD AUTO: 10.45 K/UL — SIGNIFICANT CHANGE UP (ref 3.8–10.5)

## 2022-08-08 PROCEDURE — 99233 SBSQ HOSP IP/OBS HIGH 50: CPT

## 2022-08-08 PROCEDURE — 71045 X-RAY EXAM CHEST 1 VIEW: CPT | Mod: 26,76

## 2022-08-08 RX ORDER — AZTREONAM 2 G
VIAL (EA) INJECTION
Refills: 0 | Status: DISCONTINUED | OUTPATIENT
Start: 2022-08-08 | End: 2022-08-08

## 2022-08-08 RX ORDER — VANCOMYCIN HCL 1 G
1000 VIAL (EA) INTRAVENOUS EVERY 12 HOURS
Refills: 0 | Status: DISCONTINUED | OUTPATIENT
Start: 2022-08-08 | End: 2022-08-08

## 2022-08-08 RX ORDER — AZTREONAM 2 G
2000 VIAL (EA) INJECTION ONCE
Refills: 0 | Status: COMPLETED | OUTPATIENT
Start: 2022-08-08 | End: 2022-08-08

## 2022-08-08 RX ORDER — VANCOMYCIN HCL 1 G
VIAL (EA) INTRAVENOUS
Refills: 0 | Status: DISCONTINUED | OUTPATIENT
Start: 2022-08-08 | End: 2022-08-08

## 2022-08-08 RX ORDER — AZTREONAM 2 G
2000 VIAL (EA) INJECTION EVERY 8 HOURS
Refills: 0 | Status: DISCONTINUED | OUTPATIENT
Start: 2022-08-08 | End: 2022-08-08

## 2022-08-08 RX ORDER — OXYCODONE HYDROCHLORIDE 5 MG/1
1 TABLET ORAL
Qty: 21 | Refills: 0
Start: 2022-08-08 | End: 2022-08-14

## 2022-08-08 RX ORDER — ACETAMINOPHEN 500 MG
1000 TABLET ORAL ONCE
Refills: 0 | Status: DISCONTINUED | OUTPATIENT
Start: 2022-08-08 | End: 2022-08-08

## 2022-08-08 RX ORDER — LEVOFLOXACIN 5 MG/ML
1 INJECTION, SOLUTION INTRAVENOUS
Qty: 5 | Refills: 0
Start: 2022-08-08 | End: 2022-08-12

## 2022-08-08 RX ORDER — POTASSIUM CHLORIDE 20 MEQ
20 PACKET (EA) ORAL ONCE
Refills: 0 | Status: COMPLETED | OUTPATIENT
Start: 2022-08-08 | End: 2022-08-08

## 2022-08-08 RX ORDER — POTASSIUM PHOSPHATE, MONOBASIC POTASSIUM PHOSPHATE, DIBASIC 236; 224 MG/ML; MG/ML
15 INJECTION, SOLUTION INTRAVENOUS ONCE
Refills: 0 | Status: COMPLETED | OUTPATIENT
Start: 2022-08-08 | End: 2022-08-08

## 2022-08-08 RX ORDER — GABAPENTIN 400 MG/1
1 CAPSULE ORAL
Qty: 21 | Refills: 0
Start: 2022-08-08 | End: 2022-08-14

## 2022-08-08 RX ORDER — VANCOMYCIN HCL 1 G
1000 VIAL (EA) INTRAVENOUS ONCE
Refills: 0 | Status: COMPLETED | OUTPATIENT
Start: 2022-08-08 | End: 2022-08-08

## 2022-08-08 RX ADMIN — Medication 250 MILLIGRAM(S): at 10:13

## 2022-08-08 RX ADMIN — LATANOPROST 1 DROP(S): 0.05 SOLUTION/ DROPS OPHTHALMIC; TOPICAL at 13:48

## 2022-08-08 RX ADMIN — GABAPENTIN 100 MILLIGRAM(S): 400 CAPSULE ORAL at 13:50

## 2022-08-08 RX ADMIN — Medication 100 MILLIGRAM(S): at 09:18

## 2022-08-08 RX ADMIN — HEPARIN SODIUM 5000 UNIT(S): 5000 INJECTION INTRAVENOUS; SUBCUTANEOUS at 13:07

## 2022-08-08 RX ADMIN — Medication 20 MILLIGRAM(S): at 05:46

## 2022-08-08 RX ADMIN — PREGABALIN 1000 MICROGRAM(S): 225 CAPSULE ORAL at 13:50

## 2022-08-08 RX ADMIN — POTASSIUM PHOSPHATE, MONOBASIC POTASSIUM PHOSPHATE, DIBASIC 62.5 MILLIMOLE(S): 236; 224 INJECTION, SOLUTION INTRAVENOUS at 08:36

## 2022-08-08 RX ADMIN — SIMETHICONE 80 MILLIGRAM(S): 80 TABLET, CHEWABLE ORAL at 05:45

## 2022-08-08 RX ADMIN — Medication 20 MILLIEQUIVALENT(S): at 08:35

## 2022-08-08 RX ADMIN — Medication 12.5 MILLIGRAM(S): at 13:20

## 2022-08-08 RX ADMIN — HEPARIN SODIUM 5000 UNIT(S): 5000 INJECTION INTRAVENOUS; SUBCUTANEOUS at 05:45

## 2022-08-08 RX ADMIN — GABAPENTIN 100 MILLIGRAM(S): 400 CAPSULE ORAL at 05:45

## 2022-08-08 RX ADMIN — Medication 12.5 MILLIGRAM(S): at 05:45

## 2022-08-08 RX ADMIN — LIDOCAINE 1 PATCH: 4 CREAM TOPICAL at 13:07

## 2022-08-08 NOTE — DISCHARGE NOTE NURSING/CASE MANAGEMENT/SOCIAL WORK - PATIENT PORTAL LINK FT
You can access the FollowMyHealth Patient Portal offered by [facility] by registering at the following website: [email or web address]. By joining [employer] FollowMyHealth portal, you will also be able to view your health information using other applications (apps) compatible with our system.

## 2022-08-08 NOTE — DISCHARGE NOTE PROVIDER - NSDCCPCAREPLAN_GEN_ALL_CORE_FT
PRINCIPAL DISCHARGE DIAGNOSIS  Diagnosis: Mass of left lung  Assessment and Plan of Treatment:

## 2022-08-08 NOTE — DISCHARGE NOTE PROVIDER - NSDCFUADDAPPT_GEN_ALL_CORE_FT
See Dr. Randhawa's office in 2 weeks. Please call [phone] for an apt. Please get an CXR the day before your appointment and bring the CD with you to your follow up appointment.  Take pain pills only as needed. Please take a laxative to help support your bowel movements while on pain medication. Laxatives can be available over the counter at your local pharmacy.  Please call the office at [phone] if you have fever's chills, worsening shortness of breath, chest pain, warmth, redness or purulent discharge from the insertion site.

## 2022-08-08 NOTE — DISCHARGE NOTE NURSING/CASE MANAGEMENT/SOCIAL WORK - NSDCFUADDAPPT_GEN_ALL_CORE_FT
See Dr. Randhawa's office in 2 weeks. Please call [phone] for an apt. Please get an CXR the day before your appointment and bring the CD with you to your follow up appointment.  Take pain pills only as needed. Please take a laxative to help support your bowel movements while on pain medication. Laxatives can be available over the counter at your local pharmacy.  Please call the office at [phone] if you have fever's chills, worsening shortness of breath, chest pain, warmth, redness or purulent discharge from the insertion site.

## 2022-08-08 NOTE — PROGRESS NOTE ADULT - SUBJECTIVE AND OBJECTIVE BOX
JADEN MORRELL      67y   Male   MRN-[id number]         Lopid (Other)  niacin (Other)  Norvasc (Other)  penicillin (Rash; Anaphylaxis)             Daily     Daily Drug Dosing Weight  Height (cm): 180.3 (05 Aug 2022 06:04)  Weight (kg): 84.4 (05 Aug 2022 06:04)  BMI (kg/m2): 26 (05 Aug 2022 06:04)  BSA (m2): 2.04 (05 Aug 2022 06:04)      Pt. is a 65 yo male with a left lung mass.  (27 Jul 2022 12:42)    Procedure: left uniportal VATS, CHALINO wedge then completion LULobectomy 05-Aug-2022                       Issues:               Atelectasis   Lung mass   Postoperative pain   Chest tube in place   HTN    DM2   Hx of opiate abuse    BPH    Postop course:     Patient reports moderate pain at chest wall incision sites which is worse with coughing and deep breathing without associated fever or dyspnea. Pain is improved with use of PCA and  oral pain meds.         Home Medications:  atorvastatin 10 mg oral tablet: 1 tab(s) orally once a day (05 Aug 2022 06:34)  enalapril 20 mg oral tablet: 1 tab(s) orally once a day (05 Aug 2022 06:34)  ferrous sulfate 325 mg (65 mg elemental iron) oral tablet: 1 tab(s) orally once a day (05 Aug 2022 06:34)  finasteride 5 mg oral tablet: 1 tab(s) orally once a day (05 Aug 2022 06:34)  metFORMIN 500 mg oral tablet: 1 tab(s) orally once a day (05 Aug 2022 06:34)  tamsulosin 0.4 mg oral capsule: 1 cap(s) orally once a day (05 Aug 2022 06:34)  Vitamin B12 1000 mcg oral tablet: 1 tab(s) orally once a day (05 Aug 2022 06:34)    PAST MEDICAL & SURGICAL HISTORY:  HTN (hypertension)      DM (diabetes mellitus)      Opiate abuse, continuous      Obese      History of substance abuse      Mass of left lung      No significant past surgical history        Vital Signs Last 24 Hrs  T(C): 37.2 (08 Aug 2022 08:00), Max: 37.3 (07 Aug 2022 16:00)  T(F): 99 (08 Aug 2022 08:00), Max: 99.2 (07 Aug 2022 16:00)  HR: 96 (08 Aug 2022 10:00) (90 - 125)  BP: 119/90 (08 Aug 2022 10:00) (119/90 - 162/93)  BP(mean): 100 (08 Aug 2022 10:00) (93 - 121)  RR: 26 (08 Aug 2022 10:00) (19 - 31)  SpO2: 98% (08 Aug 2022 10:00) (90% - 99%)    Parameters below as of 08 Aug 2022 10:00  Patient On (Oxygen Delivery Method): room air      I&O's Detail    07 Aug 2022 07:01  -  08 Aug 2022 07:00  --------------------------------------------------------  IN:    Lactated Ringers Bolus: 500 mL    Oral Fluid: 360 mL  Total IN: 860 mL    OUT:    Chest Tube (mL): 203 mL    Voided (mL): 520 mL  Total OUT: 723 mL    Total NET: 137 mL      08 Aug 2022 07:01  -  08 Aug 2022 10:36  --------------------------------------------------------  IN:    IV PiggyBack: 600 mL  Total IN: 600 mL    OUT:    Voided (mL): 100 mL  Total OUT: 100 mL    Total NET: 500 mL        CAPILLARY BLOOD GLUCOSE      POCT Blood Glucose.: 103 mg/dL (08 Aug 2022 07:38)  POCT Blood Glucose.: 110 mg/dL (07 Aug 2022 22:38)  POCT Blood Glucose.: 101 mg/dL (07 Aug 2022 16:10)  POCT Blood Glucose.: 108 mg/dL (07 Aug 2022 11:30)    Home Medications:  atorvastatin 10 mg oral tablet: 1 tab(s) orally once a day (05 Aug 2022 06:34)  enalapril 20 mg oral tablet: 1 tab(s) orally once a day (05 Aug 2022 06:34)  ferrous sulfate 325 mg (65 mg elemental iron) oral tablet: 1 tab(s) orally once a day (05 Aug 2022 06:34)  finasteride 5 mg oral tablet: 1 tab(s) orally once a day (05 Aug 2022 06:34)  metFORMIN 500 mg oral tablet: 1 tab(s) orally once a day (05 Aug 2022 06:34)  tamsulosin 0.4 mg oral capsule: 1 cap(s) orally once a day (05 Aug 2022 06:34)  Vitamin B12 1000 mcg oral tablet: 1 tab(s) orally once a day (05 Aug 2022 06:34)    MEDICATIONS  (STANDING):  acetaminophen   IVPB .. 1000 milliGRAM(s) IV Intermittent once  acetaminophen   IVPB .. 1000 milliGRAM(s) IV Intermittent once  atorvastatin 10 milliGRAM(s) Oral at bedtime  aztreonam  IVPB      aztreonam  IVPB 2000 milliGRAM(s) IV Intermittent every 8 hours  cyanocobalamin 1000 MICROGram(s) Oral daily  dornase carlton Solution 2.5 milliGRAM(s) Inhalation every 12 hours  enalapril 20 milliGRAM(s) Oral daily  finasteride 5 milliGRAM(s) Oral daily  gabapentin 100 milliGRAM(s) Oral every 8 hours  heparin   Injectable 5000 Unit(s) SubCutaneous every 8 hours  insulin lispro (ADMELOG) corrective regimen sliding scale   SubCutaneous three times a day before meals  insulin lispro (ADMELOG) corrective regimen sliding scale   SubCutaneous at bedtime  latanoprost 0.005% Ophthalmic Solution 1 Drop(s) Left EYE daily  levalbuterol Inhalation 0.63 milliGRAM(s) Inhalation every 6 hours  lidocaine   4% Patch 1 Patch Transdermal daily  metoprolol tartrate 12.5 milliGRAM(s) Oral every 8 hours  polyethylene glycol 3350 17 Gram(s) Oral every 12 hours  senna 2 Tablet(s) Oral at bedtime  simethicone 80 milliGRAM(s) Chew every 8 hours  sodium chloride 3%  Inhalation 4 milliLiter(s) Inhalation every 6 hours  tamsulosin 0.4 milliGRAM(s) Oral daily  vancomycin  IVPB 1000 milliGRAM(s) IV Intermittent every 12 hours  vancomycin  IVPB        MEDICATIONS  (PRN):  HYDROmorphone  Injectable 0.5 milliGRAM(s) IV Push every 3 hours PRN Severe breakthrough Pain (7 - 10)  ketorolac   Injectable 15 milliGRAM(s) IV Push every 8 hours PRN pain not controlled by PCA, acetaminophen  naloxone Injectable 0.1 milliGRAM(s) IV Push every 3 minutes PRN For ANY of the following changes in patient status:  A. RR LESS THAN 10 breaths per minute, B. Oxygen saturation LESS THAN 90%, C. Sedation score of 6  ondansetron Injectable 4 milliGRAM(s) IV Push every 6 hours PRN Nausea  oxyCODONE    IR 5 milliGRAM(s) Oral every 3 hours PRN Moderate Pain (4 - 6)  oxyCODONE    IR 10 milliGRAM(s) Oral every 3 hours PRN Severe Pain (7 - 10)        Physical exam:                             General:               Pt is awake, alert,  appears to be in pain but not in distress                              Eyes:                     Sclera white, Conjunctiva normal, Eyelids normal, Pupils symmetrical and reactive                                               Neuro:                  Nonfocal                             Psych:                   A&Ox3                          Cardiovascular:   S1 & S2, regular / irregular                          Respiratory:         Air entry is fair and equal on both sides, has bilateral conducted sounds                           GI:                          Soft, nondistended and nontender, Bowel sounds active                            Ext:                        No cyanosis or edema     Labs:                                                                           12.1   10.45 )-----------( 264      ( 08 Aug 2022 05:10 )             38.4             08-08    133<L>  |  99  |  14  ----------------------------<  117<H>  3.8   |  24  |  0.82    Ca    8.8      08 Aug 2022 05:10  Phos  1.3     08-08  Mg     2.10     08-08        CXR:  < from: Xray Chest 1 View- PORTABLE-Urgent (Xray Chest 1 View- PORTABLE-Urgent .) (08.08.22 @ 10:18) >  FINDINGS:  4:38 AM:  Left-sided chest tube is seen in place unchanged from the study of the   day before. Loss of volume in this lung post thoracotomy. Visible left   upper lobe and right lung are clear and there is no pneumothorax.    9:52 AM:  Since the last study, the left-sided chest tube has been removed. No   complicating pneumothorax. Persistent postop trace left effusion. Lungs   are clear.      COMPARISON: August 7      IMPRESSION: Follow-up studies post left thoracotomy pre and post chest   tube removal.        Plan:  General: 67yMale s/p left uniportal VATS, CHALINO wedge then completion LULobectomy 05-Aug-2022 , experiencing  pain with deep breathing.                             Neuro:                                         Pain control with Oxy IR /  Tylenol PRN                            Cardiovascular:                                          Telemetry (medical test) - Reviewed by me today independently. Normal sinus rhythm with some PVCs / A-fib.                                          Continue hemodynamic monitoring to prevent decompensation.    HTN: On Vasotec 20mg daily                            Respiratory:                                         Postop hypoxemia requiring O2 via nasal cannula probably due to postop pain - Wean nasal cannula for goal O2sat above 92%.                                              Obtain CXR . Encourage incentive spirometry.                                                   Chest PT and frequent suctioning. Continue bronchodilators, Pulmozyme and inhaled 3% saline inhalations.                                                      OOB to chair & ambulate w/ assistance.                                                           Continuous pulse oximetry for support & to prevent decompensation.                                         Monitor chest tube output                                         Chest tube to be d/cd today                                                                                           GI                                         On puree diet, advance toregular diet as tolerated                                         Continue Zofran / Reglan for nausea - PRN                                         Continue bowel regimen	                                                                 Renal:                                         BPH: Continue Flomax                                         Monitor I/Os and electrolytes                                                                                        Hem/ Onc:                                         DVT prophylaxis with SQ Heparin and SCDs                                         Monitor chest tube output &  signs of bleeding.                                          Follow CBC in AM                           Infectious disease:      On antibiotics for suspected pneumonia / tracheobronchitis  Left lung atelectasis s/p bronch                                          Monitor for fever / leukocytosis.                                          All surgical incision / chest tube  sites look clean                            Endocrine                                            DM-2: Continue Accu-Checks with coverage. Hold oral hypoglycemic meds.                                                  Pertinent clinical, laboratory, radiographic, hemodynamic, echocardiographic, respiratory data, microbiologic data and chart were reviewed and analyzed frequently throughout the course of the day and night.     Patient seen, examined and plan discussed with CT Surgeon Dr. Randhawa / CTICU team during rounds.    OOB to chair and ambulate as tolerated.     Status discussed with patient and updated plan of care.     I have spent 40 minutes with this patient including 20 minutes of time coordinating care in the ICU.          Jag Lubin MD

## 2022-08-08 NOTE — DISCHARGE NOTE NURSING/CASE MANAGEMENT/SOCIAL WORK - NSDCPEEMAIL_GEN_ALL_CORE
[facility] for Tobacco Control email [email or web address]

## 2022-08-08 NOTE — PROGRESS NOTE ADULT - SUBJECTIVE AND OBJECTIVE BOX
Chief complaint    Patient is a 67y old  Male who presents with a chief complaint of LVATS, completion CHALINO lobectomy (07 Aug 2022 10:08)   Review of systems  Patient in bed, appears comfortable.    Labs and Fingersticks  CAPILLARY BLOOD GLUCOSE      POCT Blood Glucose.: 103 mg/dL (08 Aug 2022 07:38)  POCT Blood Glucose.: 110 mg/dL (07 Aug 2022 22:38)  POCT Blood Glucose.: 101 mg/dL (07 Aug 2022 16:10)      Anion Gap, Serum: 10 (08-08 @ 05:10)  Anion Gap, Serum: 11 (08-07 @ 04:23)      Calcium, Total Serum: 8.8 (08-08 @ 05:10)  Calcium, Total Serum: 9.1 (08-07 @ 04:23)          08-08    133<L>  |  99  |  14  ----------------------------<  117<H>  3.8   |  24  |  0.82    Ca    8.8      08 Aug 2022 05:10  Phos  1.3     08-08  Mg     2.10     08-08                          12.1   10.45 )-----------( 264      ( 08 Aug 2022 05:10 )             38.4     Medications  MEDICATIONS  (STANDING):  acetaminophen   IVPB .. 1000 milliGRAM(s) IV Intermittent once  acetaminophen   IVPB .. 1000 milliGRAM(s) IV Intermittent once  atorvastatin 10 milliGRAM(s) Oral at bedtime  aztreonam  IVPB      aztreonam  IVPB 2000 milliGRAM(s) IV Intermittent every 8 hours  cyanocobalamin 1000 MICROGram(s) Oral daily  dornase carlton Solution 2.5 milliGRAM(s) Inhalation every 12 hours  enalapril 20 milliGRAM(s) Oral daily  finasteride 5 milliGRAM(s) Oral daily  gabapentin 100 milliGRAM(s) Oral every 8 hours  heparin   Injectable 5000 Unit(s) SubCutaneous every 8 hours  insulin lispro (ADMELOG) corrective regimen sliding scale   SubCutaneous three times a day before meals  insulin lispro (ADMELOG) corrective regimen sliding scale   SubCutaneous at bedtime  latanoprost 0.005% Ophthalmic Solution 1 Drop(s) Left EYE daily  levalbuterol Inhalation 0.63 milliGRAM(s) Inhalation every 6 hours  lidocaine   4% Patch 1 Patch Transdermal daily  metoprolol tartrate 12.5 milliGRAM(s) Oral every 8 hours  polyethylene glycol 3350 17 Gram(s) Oral every 12 hours  senna 2 Tablet(s) Oral at bedtime  simethicone 80 milliGRAM(s) Chew every 8 hours  sodium chloride 3%  Inhalation 4 milliLiter(s) Inhalation every 6 hours  tamsulosin 0.4 milliGRAM(s) Oral daily  vancomycin  IVPB 1000 milliGRAM(s) IV Intermittent every 12 hours  vancomycin  IVPB          Physical Exam  General: Patient comfortable in bed  Vital Signs Last 12 Hrs  T(F): 99 (08-08-22 @ 08:00), Max: 99.2 (08-08-22 @ 07:00)  HR: 96 (08-08-22 @ 11:00) (90 - 120)  BP: 126/83 (08-08-22 @ 11:00) (119/90 - 162/93)  BP(mean): 97 (08-08-22 @ 11:00) (97 - 121)  RR: 29 (08-08-22 @ 11:00) (20 - 31)  SpO2: 97% (08-08-22 @ 11:00) (90% - 98%)  Neck: No palpable thyroid nodules.  CVS: S1S2, No murmurs  Respiratory: No wheezing, no crepitations  GI: Abdomen soft, bowel sounds positive  Musculoskeletal:  edema lower extremities.     Diagnostics    Free Thyroxine, Serum: AM Sched. Collection: 07-Aug-2022 03:00 (08-06 @ 12:05)  Thyroid Stimulating Hormone, Serum: AM Sched. Collection: 07-Aug-2022 03:00 (08-06 @ 12:05)

## 2022-08-08 NOTE — DISCHARGE NOTE PROVIDER - NSDCMRMEDTOKEN_GEN_ALL_CORE_FT
atorvastatin 10 mg oral tablet: 1 tab(s) orally once a day  CXR: CXR PA/Lateral  Dx:S/p VATS, CHALINO lobectomy  Please give copy of imaging to patient and fax results to Dr. Randhawa office at [phone]  enalapril 20 mg oral tablet: 1 tab(s) orally once a day  ferrous sulfate 325 mg (65 mg elemental iron) oral tablet: 1 tab(s) orally once a day  finasteride 5 mg oral tablet: 1 tab(s) orally once a day  gabapentin 100 mg oral capsule: 1 cap(s) orally every 8 hours, As Needed -for mild pain MDD:3  levoFLOXacin 750 mg oral tablet: 1 tab(s) orally once a day   metFORMIN 500 mg oral tablet: 1 tab(s) orally once a day  oxyCODONE 5 mg oral tablet: 1 tab(s) orally every 8 hours, As Needed -Moderate Pain (4 - 6) - for severe pain MDD:3  tamsulosin 0.4 mg oral capsule: 1 cap(s) orally once a day  Vitamin B12 1000 mcg oral tablet: 1 tab(s) orally once a day

## 2022-08-08 NOTE — DISCHARGE NOTE PROVIDER - HOSPITAL COURSE
67 year old male, pmhx BPH, HTN, HLD, DM, lung nodule, s/p L VATS, CHALINO lobectomy on 8/5/2022. Postoperative course significant for postop atelectasis requiring bedside bronchoscopy on POD #0. He was started on nebulizers and had aggressive chest PT during his ICU stay. His chest tube was removed on 8/8/2022 with subsequent stable CXR. He will be discharged on a 5 day course of antibiotics. He is for discharge home.

## 2022-08-08 NOTE — DISCHARGE NOTE PROVIDER - CARE PROVIDER_API CALL
Anthony Randhawa)  Surgery; Thoracic Surgery  [id number] [place] [address], [place] [address]  Phone: [phone]  Fax: [phone]  Follow Up Time:

## 2022-08-08 NOTE — DISCHARGE NOTE NURSING/CASE MANAGEMENT/SOCIAL WORK - NSDCPEWEB_GEN_ALL_CORE
[facility] for Tobacco Control website --- [email or web address] website --- [email or web address]

## 2022-08-08 NOTE — PROGRESS NOTE ADULT - ASSESSMENT
Assessment  DMT2: 67y Male with DM T2 with hyperglycemia admitted with lung ca, was on oral hypoglycemic agents at home, now on insulin coverage, blood sugars improving, no hypoglycemic episode,  diet advanced.  Lung ca: S/P Lobectomy, chest pain, monitored.  HTN: On antihypertensive medications, monitored, asymptomatic.  Subclinical Hyperthyroidism: Not on any supplements.             Joselin Mendez MD  Cell: [phone]  Office: [phone]

## 2022-08-08 NOTE — DISCHARGE NOTE NURSING/CASE MANAGEMENT/SOCIAL WORK - NSDCPEFALRISK_GEN_ALL_CORE
For information on Fall & Injury Prevention, visit: [email or web address] OR  [email or web address] OR  https://www.cdc.gov/steadi/patient.html

## 2022-08-09 PROBLEM — R91.8 OTHER NONSPECIFIC ABNORMAL FINDING OF LUNG FIELD: Chronic | Status: ACTIVE | Noted: 2022-07-27

## 2022-08-09 PROBLEM — E66.9 OBESITY, UNSPECIFIED: Chronic | Status: ACTIVE | Noted: 2022-07-27

## 2022-08-09 PROBLEM — F19.11 OTHER PSYCHOACTIVE SUBSTANCE ABUSE, IN REMISSION: Chronic | Status: ACTIVE | Noted: 2022-07-27

## 2022-08-10 LAB — SURGICAL PATHOLOGY STUDY: SIGNIFICANT CHANGE UP

## 2022-08-11 DIAGNOSIS — R05.9 COUGH, UNSPECIFIED: ICD-10-CM

## 2022-08-11 RX ORDER — GUAIFENESIN 100 MG/5ML
100 LIQUID ORAL EVERY 4 HOURS
Qty: 840 | Refills: 0 | Status: ACTIVE | COMMUNITY
Start: 2022-08-11 | End: 1900-01-01

## 2022-08-23 ENCOUNTER — APPOINTMENT (OUTPATIENT)
Dept: THORACIC SURGERY | Facility: CLINIC | Age: 67
End: 2022-08-23

## 2022-08-23 ENCOUNTER — OUTPATIENT (OUTPATIENT)
Dept: OUTPATIENT SERVICES | Facility: HOSPITAL | Age: 67
LOS: 1 days | End: 2022-08-23

## 2022-08-23 ENCOUNTER — RESULT REVIEW (OUTPATIENT)
Age: 67
End: 2022-08-23

## 2022-08-23 ENCOUNTER — APPOINTMENT (OUTPATIENT)
Dept: RADIOLOGY | Facility: HOSPITAL | Age: 67
End: 2022-08-23

## 2022-08-23 VITALS
DIASTOLIC BLOOD PRESSURE: 89 MMHG | HEART RATE: 83 BPM | WEIGHT: 178 LBS | BODY MASS INDEX: 25.48 KG/M2 | HEIGHT: 70 IN | OXYGEN SATURATION: 94 % | SYSTOLIC BLOOD PRESSURE: 144 MMHG | RESPIRATION RATE: 16 BRPM

## 2022-08-23 DIAGNOSIS — R91.1 SOLITARY PULMONARY NODULE: ICD-10-CM

## 2022-08-23 PROCEDURE — 71046 X-RAY EXAM CHEST 2 VIEWS: CPT | Mod: 26

## 2022-08-23 PROCEDURE — 99024 POSTOP FOLLOW-UP VISIT: CPT

## 2022-10-04 ENCOUNTER — APPOINTMENT (OUTPATIENT)
Dept: UROLOGY | Facility: CLINIC | Age: 67
End: 2022-10-04

## 2022-10-04 VITALS — DIASTOLIC BLOOD PRESSURE: 92 MMHG | HEART RATE: 99 BPM | SYSTOLIC BLOOD PRESSURE: 141 MMHG

## 2022-10-04 PROCEDURE — 99214 OFFICE O/P EST MOD 30 MIN: CPT

## 2022-10-08 LAB
PSA FREE FLD-MCNC: 31 %
PSA FREE SERPL-MCNC: 0.48 NG/ML
PSA SERPL-MCNC: 1.53 NG/ML

## 2022-11-22 ENCOUNTER — APPOINTMENT (OUTPATIENT)
Dept: CT IMAGING | Facility: IMAGING CENTER | Age: 67
End: 2022-11-22

## 2022-11-29 ENCOUNTER — APPOINTMENT (OUTPATIENT)
Dept: THORACIC SURGERY | Facility: CLINIC | Age: 67
End: 2022-11-29
Payer: MEDICAID

## 2022-12-02 ENCOUNTER — APPOINTMENT (OUTPATIENT)
Dept: CT IMAGING | Facility: IMAGING CENTER | Age: 67
End: 2022-12-02

## 2022-12-28 ENCOUNTER — OUTPATIENT (OUTPATIENT)
Dept: OUTPATIENT SERVICES | Facility: HOSPITAL | Age: 67
LOS: 1 days | End: 2022-12-28
Payer: MEDICARE

## 2022-12-28 ENCOUNTER — APPOINTMENT (OUTPATIENT)
Dept: CT IMAGING | Facility: IMAGING CENTER | Age: 67
End: 2022-12-28
Payer: MEDICARE

## 2022-12-28 DIAGNOSIS — C34.92 MALIGNANT NEOPLASM OF UNSPECIFIED PART OF LEFT BRONCHUS OR LUNG: ICD-10-CM

## 2022-12-28 PROCEDURE — 71250 CT THORAX DX C-: CPT

## 2022-12-28 PROCEDURE — 71250 CT THORAX DX C-: CPT | Mod: 26,MH

## 2023-01-08 PROBLEM — C34.92 SQUAMOUS CELL CARCINOMA OF LEFT LUNG: Status: ACTIVE | Noted: 2022-08-23

## 2023-01-10 ENCOUNTER — APPOINTMENT (OUTPATIENT)
Dept: THORACIC SURGERY | Facility: CLINIC | Age: 68
End: 2023-01-10
Payer: MEDICAID

## 2023-01-10 VITALS
HEIGHT: 70 IN | BODY MASS INDEX: 26.48 KG/M2 | DIASTOLIC BLOOD PRESSURE: 109 MMHG | SYSTOLIC BLOOD PRESSURE: 171 MMHG | OXYGEN SATURATION: 94 % | HEART RATE: 92 BPM | WEIGHT: 185 LBS

## 2023-01-10 VITALS — DIASTOLIC BLOOD PRESSURE: 92 MMHG | SYSTOLIC BLOOD PRESSURE: 138 MMHG

## 2023-01-10 DIAGNOSIS — C34.92 MALIGNANT NEOPLASM OF UNSPECIFIED PART OF LEFT BRONCHUS OR LUNG: ICD-10-CM

## 2023-01-10 PROCEDURE — 99214 OFFICE O/P EST MOD 30 MIN: CPT

## 2023-01-10 NOTE — HISTORY OF PRESENT ILLNESS
[FreeTextEntry1] : Mr. JADEN MORRELL, 67 year old male, current smoker (1/2 PPD x 30 years) former  w/ hx of DM, HLD, HTN, BPH, who was referred by Dr. Benja Carl (Pulm) for enlarging CHALINO nodule. \par \par CT chest on 02/14/2022:\par - There is newly appreciated left anterior medial upper lobe nodule measuring 1.6 x 1.9 cm. This intimately abuts the anterior mediastinum.\par - Emphysematous changes. Interstitial lung disease with fibrosis. They show no significant change going back to 2019.\par \par PET/CT on 03/14/2022:\par - There is hypermetabolic nodule in the anteromedial left upper lobe of the lung, which measures 1.9 x 1.6 cm, image 111. The standard uptake values of this activity range from 5.4-6.7. \par - Emphysematous changes of the lungs with imaging evidence consistent with pulmonary fibrosis.\par - Cholelithiasis.\par - Small umbilical hernia containing fat but not bowel.\par - Enlarged prostate. \par \par CT chest on 05/05/2022: \par - 2.4 x 1.7 x 2.5 cm anterior left upper lobe medial paramediastinal mass. Lesion slightly larger then 2/14/2022 when measured 2.1 x 1.6 x 2.6 cm using same landmarks. Lesion not present in 2020. Lesion demonstrated increased FDG uptake on PET CT scan 3/14/2022.\par - Pulmonary emphysema and subpleural cysts worse upper lungs.\par - Peripheral reticular fibrosis and possible honeycomb changes upper, mid and lower lungs, difficult to distinguish from small bulla/blebs. These fibrotic changes unchanged from 2/14/2022.\par \par Now, s/p Flex bronch, uniportal left VATS, LULobectomy, MLND, on 8/5/22. 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 pT2a pN0 (Stage IB); PDL1 positive.\par \par Post op course complicated by atelectasis. s/p bronch, BAL on 8/5/22. Moderate amount of thick white secretions were present. BAL used to facilitate removal.  The left upper lobe bronchial stump appeared to be intact. Discharged home on a 5 day course of antibiotic. \par \par CXR on 8/23/22: Stable post op changes\par \par CT Chest on 12/28/22:\par - Status post left upper lobectomy.\par - Stable reticular opacities within both lungs when compared to previous exam.\par \par Patient is here today for a follow up. Today, patient denies worsening SOB, chest pain, cough, hemoptysis, fever, chills, night sweats, lightheadedness or dizziness.\par \par

## 2023-01-10 NOTE — ASSESSMENT
[FreeTextEntry1] : Mr. JADEN MORRELL, 67 year old male, current smoker (1/2 PPD x 30 years) former  w/ hx of DM, HLD, HTN, BPH, who was referred by Dr. Benja Carl (Pulm) for enlarging CHALINO nodule. \par \par Now, s/p Flex bronch, uniportal left VATS, LULobectomy, MLND, on 8/5/22. 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 pT2a pN0 (Stage IB); PDL1 positive.\par \par Here today with follow up scan. \par \par I have independently reviewed the medical records and imaging at the time of this office consultation, and discussed the following interpretations with the patient:\par -  CT chest reviewed with patient. Stable findings. Recommendation to return to clinic in 3 months with CXR to re-evaluate post op stability. \par \par Recommendations reviewed with patient during this office visit, and all questions answered; Patient instructed on the importance of follow up and verbalizes understanding.\par \par Recommendations reviewed with patient during this office visit, and all questions answered; Patient instructed on the importance of follow up and verbalizes understanding.\par \par \par \par \par

## 2023-01-10 NOTE — CONSULT LETTER
[FreeTextEntry2] : Dr. Benja Carl (Pulm/Ref) [FreeTextEntry3] : Anthony Randhawa MD, FACS \par Chief, Division of Thoracic Surgery \par Director, Minimally Invasive Thoracic Surgery \par Department of Cardiovascular and Thoracic Surgery \par [facility] \par , Cardiovascular and Thoracic Surgery\par

## 2023-01-10 NOTE — PHYSICAL EXAM
[Respiration, Rhythm And Depth] : normal respiratory rhythm and effort [Exaggerated Use Of Accessory Muscles For Inspiration] : no accessory muscle use [Auscultation Breath Sounds / Voice Sounds] : lungs were clear to auscultation bilaterally [Heart Rate And Rhythm] : heart rate was normal and rhythm regular [Examination Of The Chest] : the chest was normal in appearance [Chest Visual Inspection Thoracic Asymmetry] : no chest asymmetry [Diminished Respiratory Excursion] : normal chest expansion [2+] : left 2+ [Breast Appearance] : normal in appearance [Breast Palpation Mass] : no palpable masses [Involuntary Movements] : no involuntary movements were seen [Skin Color & Pigmentation] : normal skin color and pigmentation [Skin Turgor] : normal skin turgor [] : no rash [No Focal Deficits] : no focal deficits [Oriented To Time, Place, And Person] : oriented to person, place, and time

## 2023-04-04 ENCOUNTER — APPOINTMENT (OUTPATIENT)
Dept: RADIOLOGY | Facility: IMAGING CENTER | Age: 68
End: 2023-04-04

## 2023-04-07 NOTE — CONSULT LETTER
[Dear  ___] : Dear  [unfilled], [Consult Letter:] : I had the pleasure of evaluating your patient, [unfilled]. [( Thank you for referring [unfilled] for consultation for _____ )] : Thank you for referring [unfilled] for consultation for [unfilled] [Please see my note below.] : Please see my note below. [Consult Closing:] : Thank you very much for allowing me to participate in the care of this patient.  If you have any questions, please do not hesitate to contact me. [Sincerely,] : Sincerely, [FreeTextEntry2] : Dr. Benja Carl (Pulm/Ref) [FreeTextEntry3] : Anthony Randhawa MD, FACS \par Chief, Division of Thoracic Surgery \par Director, Minimally Invasive Thoracic Surgery \par Department of Cardiovascular and Thoracic Surgery \par [facility] \par , Cardiovascular and Thoracic Surgery\par

## 2023-04-07 NOTE — HISTORY OF PRESENT ILLNESS
[FreeTextEntry1] : Mr. JADEN MORRELL, 67 year old male, current smoker (1/2 PPD x 30 years) former  w/ hx of DM, HLD, HTN, BPH, who was referred by Dr. Benja Carl (Pulm) for enlarging CHALINO nodule. \par \par CT chest on 02/14/2022:\par - There is newly appreciated left anterior medial upper lobe nodule measuring 1.6 x 1.9 cm. This intimately abuts the anterior mediastinum.\par - Emphysematous changes. Interstitial lung disease with fibrosis. They show no significant change going back to 2019.\par \par PET/CT on 03/14/2022:\par - There is hypermetabolic nodule in the anteromedial left upper lobe of the lung, which measures 1.9 x 1.6 cm, image 111. The standard uptake values of this activity range from 5.4-6.7. \par - Emphysematous changes of the lungs with imaging evidence consistent with pulmonary fibrosis.\par - Cholelithiasis.\par - Small umbilical hernia containing fat but not bowel.\par - Enlarged prostate. \par \par CT chest on 05/05/2022: \par - 2.4 x 1.7 x 2.5 cm anterior left upper lobe medial paramediastinal mass. Lesion slightly larger then 2/14/2022 when measured 2.1 x 1.6 x 2.6 cm using same landmarks. Lesion not present in 2020. Lesion demonstrated increased FDG uptake on PET CT scan 3/14/2022.\par - Pulmonary emphysema and subpleural cysts worse upper lungs.\par - Peripheral reticular fibrosis and possible honeycomb changes upper, mid and lower lungs, difficult to distinguish from small bulla/blebs. These fibrotic changes unchanged from 2/14/2022.\par \par Now, s/p Flex bronch, uniportal left VATS, LULobectomy, MLND, on 8/5/22. 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 pT2a pN0 (Stage IB); PDL1 positive.\par \par Post op course complicated by atelectasis. s/p bronch, BAL on 8/5/22. Moderate amount of thick white secretions were present. BAL used to facilitate removal.  The left upper lobe bronchial stump appeared to be intact. Discharged home on a 5 day course of antibiotic. \par \par CXR on 8/23/22: Stable post op changes\par \par CT Chest on 12/28/22:\par - Status post left upper lobectomy.\par - Stable reticular opacities within both lungs when compared to previous exam.\par \par CXR on 4/11/23: \par Patient is here today for a follow up.

## 2023-04-07 NOTE — ASSESSMENT
[FreeTextEntry1] : Mr. JADEN MORRELL, 67 year old male, current smoker (1/2 PPD x 30 years) former  w/ hx of DM, HLD, HTN, BPH, who was referred by Dr. Benja Carl (Pulm) for enlarging CHALINO nodule. \par \par Now, s/p Flex bronch, uniportal left VATS, LULobectomy, MLND, on 8/5/22. 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 pT2a pN0 (Stage IB); PDL1 positive.\par \par Here today with follow up scan. \par \par I have independently reviewed the medical records and imaging at the time of this office consultation, and discussed the following interpretations with the patient:\par -  \par \par Recommendations reviewed with patient during this office visit, and all questions answered; Patient instructed on the importance of follow up and verbalizes understanding.\par \par \par \par \par

## 2023-04-11 ENCOUNTER — APPOINTMENT (OUTPATIENT)
Dept: THORACIC SURGERY | Facility: CLINIC | Age: 68
End: 2023-04-11

## 2023-12-25 NOTE — ASU PREOP CHECKLIST - SKIN PREP
[facility]     Progress Note    Patient Name: Danny Savage  : 1956  MRN: [id number]  Primary Care Physician:  Kodi Pichardo MD  Date of admission: 2023    Subjective   Subjective     Chief Complaint: Patient intubated    Back Pain      Patient Reports patient is intubated.  Patient has had a left partial nephrectomy back in November of this year.  He was having excessive pain and so CT scan was performed that was showed an area about 4-1/2 cm on the left side by the surgical site.  He came back to see us with pain and we repeated the CT scan and the area the collection has gone down from 4.5 to 2.9 cm.  While in the hospital patient became hemodynamically unstable and we initially wanted to drain the collection on the left side but because of instability a CT scan was performed and it showed a ruptured abdominal aortic aneurysm.  Patient underwent repair with graft of the AAA.    Review of Systems   Musculoskeletal:  Positive for back pain.       Objective   Objective     Vitals:   Temp:  [89.2 °F (31.8 °C)-98.6 °F (37 °C)] 92 °F (33.3 °C)  Heart Rate:  [] 129  Resp:  [18-26] 26  BP: ()/() 137/77  Flow (L/min):  [1.5] 1.5  FiO2 (%):  [30 %-100 %] 30 %    Physical Exam   He is afebrile he is on pressors at this moment.  He is not awake or alert.  Abdomen is very distended there is blood collecting under the dressing where the AAA was performed he does have a wound VAC in place.  Result Review    Result Review:  I have personally reviewed the results from the time of this admission to 2023 08:42 EST and agree with these findings:  [x]  Laboratory list / accordion  []  Microbiology  [x]  Radiology  []  EKG/Telemetry   []  Cardiology/Vascular   []  Pathology  []  Old records  []  Other:  Most notable findings include: Ruptured abdominal aortic aneurysm.      Assessment & Plan   Assessment / Plan     Brief Patient Summary:  Danny Savage is a 67 y.o. male who  ruptured abdominal aortic aneurysm.    Active Hospital Problems:  Active Hospital Problems    Diagnosis    • **Left flank pain    • Ruptured infrarenal abdominal aortic aneurysm (AAA)      Plan:   Recommendation and plan include observing the patient he is hemodynamically unstable right now and intensivist is consulted and is managing his hemodynamic status.  I think as far as the collection on the left side we can hold off any drainage at this point until he does become stable.    DVT prophylaxis:  Mechanical DVT prophylaxis orders are present.    CODE STATUS:    Level Of Support Discussed With: Patient  Code Status (Patient has no pulse and is not breathing): CPR (Attempt to Resuscitate)  Medical Interventions (Patient has pulse or is breathing): Full Support    Disposition:  I expect patient to be discharged floor.    Iliana Ozuna MD              done

## 2023-12-27 PROBLEM — Z12.5 PROSTATE CANCER SCREENING: Status: ACTIVE | Noted: 2021-10-01

## 2023-12-27 PROBLEM — N40.1 BPH WITH OBSTRUCTION/LOWER URINARY TRACT SYMPTOMS: Status: ACTIVE | Noted: 2021-09-30

## 2023-12-27 NOTE — PHYSICAL EXAM
[Normal Appearance] : normal appearance [Well Groomed] : well groomed [Edema] : no peripheral edema [General Appearance - In No Acute Distress] : no acute distress [Respiration, Rhythm And Depth] : normal respiratory rhythm and effort [Exaggerated Use Of Accessory Muscles For Inspiration] : no accessory muscle use [Abdomen Soft] : soft [Abdomen Tenderness] : non-tender [Costovertebral Angle Tenderness] : no ~M costovertebral angle tenderness [Urinary Bladder Findings] : the bladder was normal on palpation [Normal Station and Gait] : the gait and station were normal for the patient's age [] : no rash [No Focal Deficits] : no focal deficits [Oriented To Time, Place, And Person] : oriented to person, place, and time [Affect] : the affect was normal [Mood] : the mood was normal [No Palpable Adenopathy] : no palpable adenopathy

## 2023-12-28 ENCOUNTER — APPOINTMENT (OUTPATIENT)
Dept: UROLOGY | Facility: CLINIC | Age: 68
End: 2023-12-28
Payer: MEDICARE

## 2023-12-28 VITALS
SYSTOLIC BLOOD PRESSURE: 146 MMHG | DIASTOLIC BLOOD PRESSURE: 94 MMHG | HEART RATE: 114 BPM | WEIGHT: 196 LBS | OXYGEN SATURATION: 94 % | HEIGHT: 70 IN | BODY MASS INDEX: 28.06 KG/M2

## 2023-12-28 DIAGNOSIS — N40.1 BENIGN PROSTATIC HYPERPLASIA WITH LOWER URINARY TRACT SYMPMS: ICD-10-CM

## 2023-12-28 DIAGNOSIS — N13.8 BENIGN PROSTATIC HYPERPLASIA WITH LOWER URINARY TRACT SYMPMS: ICD-10-CM

## 2023-12-28 DIAGNOSIS — Z12.5 ENCOUNTER FOR SCREENING FOR MALIGNANT NEOPLASM OF PROSTATE: ICD-10-CM

## 2023-12-28 PROCEDURE — 99214 OFFICE O/P EST MOD 30 MIN: CPT

## 2023-12-28 RX ORDER — TAMSULOSIN HYDROCHLORIDE 0.4 MG/1
0.4 CAPSULE ORAL
Qty: 180 | Refills: 3 | Status: ACTIVE | COMMUNITY
Start: 2021-09-30 | End: 1900-01-01

## 2023-12-28 RX ORDER — FINASTERIDE 5 MG/1
5 TABLET, FILM COATED ORAL
Qty: 90 | Refills: 3 | Status: ACTIVE | COMMUNITY
Start: 2021-10-22 | End: 1900-01-01

## 2023-12-28 NOTE — LETTER BODY
[Dear  ___] : Dear  [unfilled], [Courtesy Letter:] : I had the pleasure of seeing your patient, [unfilled], in my office today. [Please see my note below.] : Please see my note below. [FreeTextEntry2] : Taylor Horvath [address] [place] [address] [FreeTextEntry3] : Sincerely,    Enoch Henderson MD, FACS Chief of Urology, [facility]  of Urology  [facility] [address] [place] [address] P: [phone] F: [phone] [email or web address]

## 2023-12-28 NOTE — HISTORY OF PRESENT ILLNESS
[FreeTextEntry1] : Marc Macias returns to the office today.  He is 68 years old and here today in follow-up on lower urinary tract symptoms.  The patient has BPH and has been treated with combination therapy using tamsulosin and finasteride to mitigate the urinary symptoms.  I last saw him in October 2022.  At the time, he had reported improvement with the medications but still having some symptoms of weakness of the urinary stream with prolonged voiding times and nocturia 3-4 times overnight.  He was intermittently feeling sensations of incomplete bladder emptying.  At that time, he had also recently undergone lung surgery to treat squamous cell carcinoma.  Current medication regimen for the BPH includes tamsulosin 0.8 mg daily and finasteride 5 mg daily.  We have discussed consideration of surgery given his persistent urinary symptomatic bother.  However, at this point while he still has symptoms, he feels as though they are under reasonable control at this time and is not interested to consider anything procedural for now.  PSA level done last year in October 2022 was 1.53 ng/mL with 31% free fraction.

## 2023-12-28 NOTE — ASSESSMENT
[FreeTextEntry1] : I renewed his prescriptions for both the tamsulosin and finasteride.  He will continue with the combination therapy.  Hopefully we have seen some stability with this dual therapy and will not need to progress to anything more invasive for treatment of the urinary symptoms but he does understand that these options remain open to him should he have any progression of his symptoms or if he were to develop issues of retention or infection.  The PSA level was repeated today and found to be 1.49 ng/mL with 31% free fraction.  This level is stable compared with last year.  Even taking into account his use of finasteride, I would consider this level to show that he is not at any significant elevated risk of harboring prostate cancer and I would recommend continued screening but no need for prostate imaging or biopsy at this time.  Return for follow-up in 1 year.

## 2023-12-29 LAB
PSA FREE FLD-MCNC: 31 %
PSA FREE SERPL-MCNC: 0.46 NG/ML
PSA SERPL-MCNC: 1.49 NG/ML

## 2024-03-29 ENCOUNTER — NON-APPOINTMENT (OUTPATIENT)
Age: 69
End: 2024-03-29

## 2024-04-01 ENCOUNTER — NON-APPOINTMENT (OUTPATIENT)
Age: 69
End: 2024-04-01

## 2024-04-02 ENCOUNTER — TRANSCRIPTION ENCOUNTER (OUTPATIENT)
Age: 69
End: 2024-04-02

## 2024-04-02 ENCOUNTER — OUTPATIENT (OUTPATIENT)
Dept: OUTPATIENT SERVICES | Facility: HOSPITAL | Age: 69
LOS: 1 days | End: 2024-04-02
Payer: COMMERCIAL

## 2024-04-02 VITALS
OXYGEN SATURATION: 99 % | TEMPERATURE: 98 F | DIASTOLIC BLOOD PRESSURE: 100 MMHG | HEART RATE: 90 BPM | SYSTOLIC BLOOD PRESSURE: 170 MMHG | HEIGHT: 70 IN | RESPIRATION RATE: 16 BRPM | WEIGHT: 199.08 LBS

## 2024-04-02 VITALS
HEART RATE: 79 BPM | RESPIRATION RATE: 17 BRPM | DIASTOLIC BLOOD PRESSURE: 77 MMHG | OXYGEN SATURATION: 97 % | SYSTOLIC BLOOD PRESSURE: 164 MMHG

## 2024-04-02 DIAGNOSIS — Z98.890 OTHER SPECIFIED POSTPROCEDURAL STATES: Chronic | ICD-10-CM

## 2024-04-02 DIAGNOSIS — R06.00 DYSPNEA, UNSPECIFIED: ICD-10-CM

## 2024-04-02 LAB
ALBUMIN SERPL ELPH-MCNC: 4.1 G/DL — SIGNIFICANT CHANGE UP (ref 3.3–5)
ALP SERPL-CCNC: 125 U/L — HIGH (ref 40–120)
ALT FLD-CCNC: 15 U/L — SIGNIFICANT CHANGE UP (ref 10–45)
ANION GAP SERPL CALC-SCNC: 14 MMOL/L — SIGNIFICANT CHANGE UP (ref 5–17)
AST SERPL-CCNC: 37 U/L — SIGNIFICANT CHANGE UP (ref 10–40)
BILIRUB SERPL-MCNC: 0.7 MG/DL — SIGNIFICANT CHANGE UP (ref 0.2–1.2)
BUN SERPL-MCNC: 11 MG/DL — SIGNIFICANT CHANGE UP (ref 7–23)
CALCIUM SERPL-MCNC: 9.6 MG/DL — SIGNIFICANT CHANGE UP (ref 8.4–10.5)
CHLORIDE SERPL-SCNC: 105 MMOL/L — SIGNIFICANT CHANGE UP (ref 96–108)
CO2 SERPL-SCNC: 21 MMOL/L — LOW (ref 22–31)
CREAT SERPL-MCNC: 0.86 MG/DL — SIGNIFICANT CHANGE UP (ref 0.5–1.3)
EGFR: 96 ML/MIN/1.73M2 — SIGNIFICANT CHANGE UP
GLUCOSE BLDC GLUCOMTR-MCNC: 162 MG/DL — HIGH (ref 70–99)
GLUCOSE SERPL-MCNC: 172 MG/DL — HIGH (ref 70–99)
HCT VFR BLD CALC: 44.2 % — SIGNIFICANT CHANGE UP (ref 39–50)
HGB BLD-MCNC: 13.5 G/DL — SIGNIFICANT CHANGE UP (ref 13–17)
MCHC RBC-ENTMCNC: 30.3 PG — SIGNIFICANT CHANGE UP (ref 27–34)
MCHC RBC-ENTMCNC: 30.5 GM/DL — LOW (ref 32–36)
MCV RBC AUTO: 99.1 FL — SIGNIFICANT CHANGE UP (ref 80–100)
NRBC # BLD: 0 /100 WBCS — SIGNIFICANT CHANGE UP (ref 0–0)
PLATELET # BLD AUTO: 264 K/UL — SIGNIFICANT CHANGE UP (ref 150–400)
POTASSIUM SERPL-MCNC: 5.6 MMOL/L — HIGH (ref 3.5–5.3)
POTASSIUM SERPL-SCNC: 5.6 MMOL/L — HIGH (ref 3.5–5.3)
PROT SERPL-MCNC: 8.8 G/DL — HIGH (ref 6–8.3)
RBC # BLD: 4.46 M/UL — SIGNIFICANT CHANGE UP (ref 4.2–5.8)
RBC # FLD: 14.2 % — SIGNIFICANT CHANGE UP (ref 10.3–14.5)
SODIUM SERPL-SCNC: 140 MMOL/L — SIGNIFICANT CHANGE UP (ref 135–145)
WBC # BLD: 8.22 K/UL — SIGNIFICANT CHANGE UP (ref 3.8–10.5)
WBC # FLD AUTO: 8.22 K/UL — SIGNIFICANT CHANGE UP (ref 3.8–10.5)

## 2024-04-02 PROCEDURE — C1887: CPT

## 2024-04-02 PROCEDURE — 99152 MOD SED SAME PHYS/QHP 5/>YRS: CPT

## 2024-04-02 PROCEDURE — C9600: CPT | Mod: LC

## 2024-04-02 PROCEDURE — 93010 ELECTROCARDIOGRAM REPORT: CPT | Mod: 77,59

## 2024-04-02 PROCEDURE — 93458 L HRT ARTERY/VENTRICLE ANGIO: CPT | Mod: 59

## 2024-04-02 PROCEDURE — 92928 PRQ TCAT PLMT NTRAC ST 1 LES: CPT | Mod: LC

## 2024-04-02 PROCEDURE — 85027 COMPLETE CBC AUTOMATED: CPT

## 2024-04-02 PROCEDURE — 93005 ELECTROCARDIOGRAM TRACING: CPT

## 2024-04-02 PROCEDURE — 93010 ELECTROCARDIOGRAM REPORT: CPT | Mod: 59

## 2024-04-02 PROCEDURE — 93458 L HRT ARTERY/VENTRICLE ANGIO: CPT | Mod: 26,59

## 2024-04-02 PROCEDURE — 99261: CPT

## 2024-04-02 PROCEDURE — C1725: CPT

## 2024-04-02 PROCEDURE — 82962 GLUCOSE BLOOD TEST: CPT

## 2024-04-02 PROCEDURE — C1769: CPT

## 2024-04-02 PROCEDURE — C1894: CPT

## 2024-04-02 PROCEDURE — 80053 COMPREHEN METABOLIC PANEL: CPT

## 2024-04-02 PROCEDURE — C1874: CPT

## 2024-04-02 RX ORDER — ASPIRIN/CALCIUM CARB/MAGNESIUM 324 MG
1 TABLET ORAL
Qty: 90 | Refills: 3
Start: 2024-04-02 | End: 2025-03-27

## 2024-04-02 RX ORDER — ATORVASTATIN CALCIUM 80 MG/1
1 TABLET, FILM COATED ORAL
Refills: 0 | DISCHARGE

## 2024-04-02 RX ORDER — METFORMIN HYDROCHLORIDE 850 MG/1
1 TABLET ORAL
Qty: 0 | Refills: 0 | DISCHARGE

## 2024-04-02 RX ORDER — TAMSULOSIN HYDROCHLORIDE 0.4 MG/1
1 CAPSULE ORAL
Qty: 0 | Refills: 0 | DISCHARGE

## 2024-04-02 RX ORDER — SODIUM CHLORIDE 9 MG/ML
250 INJECTION INTRAMUSCULAR; INTRAVENOUS; SUBCUTANEOUS ONCE
Refills: 0 | Status: COMPLETED | OUTPATIENT
Start: 2024-04-02 | End: 2024-04-02

## 2024-04-02 RX ORDER — VALSARTAN 80 MG/1
1 TABLET ORAL
Refills: 0 | DISCHARGE

## 2024-04-02 RX ORDER — PREGABALIN 225 MG/1
1 CAPSULE ORAL
Qty: 0 | Refills: 0 | DISCHARGE

## 2024-04-02 RX ORDER — FINASTERIDE 5 MG/1
1 TABLET, FILM COATED ORAL
Refills: 0 | DISCHARGE

## 2024-04-02 RX ORDER — HYDRALAZINE HCL 50 MG
1 TABLET ORAL
Refills: 0 | DISCHARGE

## 2024-04-02 RX ORDER — FERROUS SULFATE 325(65) MG
1 TABLET ORAL
Qty: 0 | Refills: 0 | DISCHARGE

## 2024-04-02 RX ORDER — ASPIRIN/CALCIUM CARB/MAGNESIUM 324 MG
1 TABLET ORAL
Refills: 0 | DISCHARGE

## 2024-04-02 RX ORDER — CLOPIDOGREL BISULFATE 75 MG/1
1 TABLET, FILM COATED ORAL
Qty: 90 | Refills: 3
Start: 2024-04-02 | End: 2025-03-27

## 2024-04-02 RX ORDER — TAMSULOSIN HYDROCHLORIDE 0.4 MG/1
1 CAPSULE ORAL
Refills: 0 | DISCHARGE

## 2024-04-02 RX ORDER — METOPROLOL TARTRATE 50 MG
1 TABLET ORAL
Refills: 0 | DISCHARGE

## 2024-04-02 RX ORDER — SODIUM CHLORIDE 9 MG/ML
1000 INJECTION INTRAMUSCULAR; INTRAVENOUS; SUBCUTANEOUS
Refills: 0 | Status: DISCONTINUED | OUTPATIENT
Start: 2024-04-02 | End: 2024-04-16

## 2024-04-02 RX ORDER — FINASTERIDE 5 MG/1
1 TABLET, FILM COATED ORAL
Qty: 0 | Refills: 0 | DISCHARGE

## 2024-04-02 RX ORDER — CHOLECALCIFEROL (VITAMIN D3) 125 MCG
1 CAPSULE ORAL
Refills: 0 | DISCHARGE

## 2024-04-02 RX ORDER — ATORVASTATIN CALCIUM 80 MG/1
1 TABLET, FILM COATED ORAL
Qty: 0 | Refills: 0 | DISCHARGE

## 2024-04-02 RX ORDER — ISOSORBIDE MONONITRATE 60 MG/1
1 TABLET, EXTENDED RELEASE ORAL
Refills: 0 | DISCHARGE

## 2024-04-02 RX ADMIN — SODIUM CHLORIDE 75 MILLILITER(S): 9 INJECTION INTRAMUSCULAR; INTRAVENOUS; SUBCUTANEOUS at 13:09

## 2024-04-02 RX ADMIN — SODIUM CHLORIDE 750 MILLILITER(S): 9 INJECTION INTRAMUSCULAR; INTRAVENOUS; SUBCUTANEOUS at 08:02

## 2024-04-02 NOTE — ASU DISCHARGE PLAN (ADULT/PEDIATRIC) - CARE PROVIDER_API CALL
Nadir Gongora  Cardiovascular Disease  [address] [place] [address]  Phone: [phone]  Fax: [phone]  Established Patient  Follow Up Time: 2 weeks

## 2024-04-02 NOTE — ASU DISCHARGE PLAN (ADULT/PEDIATRIC) - PLEASE INDICATE TEMPERATURE IN FAHRENHEIT OR CELSIUS
100.4F [de-identified] : \par 12/14/21 ECG: Sinus rhythm, rate 86 bpm, RSR' pattern in V1, cannot rule out inferior infarct, cannot rule out anterolateral infarct. No significant change from 12/8/20 ECG.\par 12/8/20 ECG: Sinus rhythm, rate 87 bpm, RSR' pattern in V1, cannot rule out inferolateral infarct. No change from 2/14/20 ECG. \par 2/14/20 ECG: Sinus rhythm, rate 76 bpm, RSR' pattern in V1, cannot rule out inferolateral infarct. No significant change from 5/7/19 ECG.\par 5/7/19 ECG: Sinus rhythm, rate 66 bpm, RSR' pattern in V1, cannot rule out inferolateral infarct, possible LA enlargement. No significant change from 2/6/19 or 7/20/18 ECGs.\par  [de-identified] :   \par 8/2/18 Lexiscan Myoview (at [facility]): Epigastric discomfort but no ECG changes. Small area of mild apical inferior ischemia. Remainder of LV demonstrated normal myocardial perfusion. Hyperdynamic LV wall motion with LVEF > 75%.\par  [de-identified] : \par 9/3/19 Echo: Normal LV size and systolic function, LVEF 68%. Mild conc LVH. Mild AV sclerosis. Mild TR. \par \par 12/12/17 Echo: Technically limited study. Normal LV systolic function, EF 58%. Mild LV diastolic dysfunction. Mild LVH. Thickened MV leaflets. Mild MR. AV sclerosis. Mild TR. PASP 26 mmHg.\par \par 12/29/15 Echo: Normal LV size and wall motion. Mild diastolic dysfunction. Min conc LVH. MV leaflets are mildly thickened. Mild MR/TR/CT. Minimal pericardial effusion.

## 2024-04-02 NOTE — ASU DISCHARGE PLAN (ADULT/PEDIATRIC) - NS MD DC FALL RISK RISK
For information on Fall & Injury Prevention, visit: [email or web address] OR  [email or web address] OR  https://www.cdc.gov/steadi/patient.html

## 2024-04-02 NOTE — H&P CARDIOLOGY - NSICDXPASTMEDICALHX_GEN_ALL_CORE_FT
PAST MEDICAL HISTORY:  DM (diabetes mellitus)     History of substance abuse     HTN (hypertension)     Lung cancer     Mass of left lung     Obese     Opiate abuse, continuous

## 2024-04-02 NOTE — ASU DISCHARGE PLAN (ADULT/PEDIATRIC) - PROCEDURE
Cardiac catheterization: FLORENTINO x1 mLCx, mLAD bridge via RRA (one stent to mid left circumflex coronary artery)

## 2024-04-02 NOTE — ASU DISCHARGE PLAN (ADULT/PEDIATRIC) - ASU DC SPECIAL INSTRUCTIONSFT
Wound Care:   the day AFTER your procedure remove bandage GENTLY, and clean using  mild soap and gentle warm, water stream, pat dry. leave OPEN to air. YOU MAY SHOWER   DO NOT apply lotions, creams, ointments, powder, perfumes to your incision site  DO NOT SOAK your site for 1 week ( no baths, no pools, no tubs, etc...)  Check  your groin and/or wrist daily. A small amount of bruising, and soreness are normal    ACTIVITY: for 24 hours   - DO NOT DRIVE  - DO NOT make any important decisions or sign legal documents   - DO NOT operate heavy machineries   - you may resume sexual activity in 48 hours, unless otherwise instructed by your cardiologist     If your procedure was done through the WRIST: for the NEXT 3 DAYS  - avoid pushing, pulling, with that affected wrist   - avoid repeated movement of that hand and wrist ( eg: typing, hammering)  - DO NOT LIFT anything more than 5 lbs     If your procedure was done through the GROIN: for the NEXT 5 DAYS  - Limit climbing stairs, DO NOT soak in bathtub or pool  - no strenuous activities, pushing, pulling, straining  - Do not lift anything 10lbs or heavier     MEDICATION:   take your medications as explained ( see discharge paperwork)   If you received a STENT, you will be taking antiplatelet medications to KEEP YOUR STENT OPEN ( eg: Aspirin, Plavix, Brilinta, Effient, etc).  Take as prescribed DO NOT STOP taking them without consulting with your cardiologist first.     Follow heart healthy diet recommended by your doctor, , if you smoke STOP SMOKING ( may call [phone] for center of tobacco control if you need assistance)     CALL your doctor to make appointment in 2 WEEKS     ***CALL YOUR DOCTOR***  if you experience: fever, chills, body aches, or severe pain, swelling, redness, heat or yellow discharge at incision site  If you experience Bleeding or excruciating pain at the procedural site, swelling (golf ball size) at your procedural site  If you experience CHEST PAIN  If you experience extremity numbness, tingling, temperature change (of your procedural site)   If you are unable to reach your doctor, you may contact:   -Cardiology Office at [facility] at [phone] or   - [facility] [phone]  - [facility] [phone]

## 2024-04-02 NOTE — H&P CARDIOLOGY - HISTORY OF PRESENT ILLNESS
69 y/o male, former cigarette smoker x 20+ PY with PMHx of Polysubstance abuse (cocaine, heroin), Lung CA s/p Left VATS, BPH, HTN, HLD and T2DM offering c/o class II anginal exertional dyspnea starting in 2022 after his VATS procedure and has persisted to date.  Patient was evaluated by Cardiologist and is s/p TTE in 2/2024 demonstrating reduced EF 40-45% and Pharm NST on 3/8/2024 demonstrating small size infarct with laura-infarct ischemia in basal inferolateral segment and ischemia located in the mid inferolateral segment.  Patient is now for [facility].   Patient is a poor historian.      Cardiologist - Dr. Gongora  69 y/o male, former cigarette smoker x 20+ PY with PMHx of Polysubstance abuse (cocaine, heroin last use 2021), current daily ETOH use (2 glasses of Natasha daily, last drink 3/31) Lung CA s/p Left VATS, BPH, HTN, HLD and T2DM offering c/o class II anginal exertional dyspnea starting in 2022 after his VATS procedure and has persisted to date.  Patient was evaluated by Cardiologist and is s/p TTE in 2/2024 demonstrating reduced EF 40-45% and Pharm NST on 3/8/2024 demonstrating small size infarct with laura-infarct ischemia in basal inferolateral segment and ischemia located in the mid inferolateral segment.  Patient is now for [facility].   Patient is a poor historian.      Cardiologist - Dr. Gongora

## 2025-01-02 ENCOUNTER — NON-APPOINTMENT (OUTPATIENT)
Age: 70
End: 2025-01-02

## 2025-01-02 ENCOUNTER — APPOINTMENT (OUTPATIENT)
Dept: UROLOGY | Facility: CLINIC | Age: 70
End: 2025-01-02
Payer: MEDICARE

## 2025-01-02 VITALS
SYSTOLIC BLOOD PRESSURE: 151 MMHG | HEART RATE: 86 BPM | BODY MASS INDEX: 27.06 KG/M2 | OXYGEN SATURATION: 96 % | DIASTOLIC BLOOD PRESSURE: 87 MMHG | HEIGHT: 70 IN | WEIGHT: 189 LBS

## 2025-01-02 DIAGNOSIS — N13.8 BENIGN PROSTATIC HYPERPLASIA WITH LOWER URINARY TRACT SYMPMS: ICD-10-CM

## 2025-01-02 DIAGNOSIS — Z12.5 ENCOUNTER FOR SCREENING FOR MALIGNANT NEOPLASM OF PROSTATE: ICD-10-CM

## 2025-01-02 DIAGNOSIS — N40.1 BENIGN PROSTATIC HYPERPLASIA WITH LOWER URINARY TRACT SYMPMS: ICD-10-CM

## 2025-01-02 PROCEDURE — G2211 COMPLEX E/M VISIT ADD ON: CPT

## 2025-01-02 PROCEDURE — 99214 OFFICE O/P EST MOD 30 MIN: CPT

## 2025-01-03 LAB
PSA FREE FLD-MCNC: 33 %
PSA FREE SERPL-MCNC: 0.54 NG/ML
PSA SERPL-MCNC: 1.67 NG/ML

## (undated) DEVICE — ELCTR GROUNDING PAD ADULT COVIDIEN

## (undated) DEVICE — PACK MAJOR ABDOMINAL W ENDO DRAPE

## (undated) DEVICE — STAPLER ECHELON FLEX POWERED ADV PLCMT TIP

## (undated) DEVICE — DRSG TEGADERM 4X4.75"

## (undated) DEVICE — SYR LUER LOK 10CC

## (undated) DEVICE — SUT SILK 2-0 30" TIES

## (undated) DEVICE — ENDOCATCH GENERAL 10MM (PURPLE)

## (undated) DEVICE — STAPLER ECHELON FLEX POWERED PLUS 340MM

## (undated) DEVICE — PROTECTOR HEEL / ELBOW FLUFFY

## (undated) DEVICE — ADAPTER FIBEROPTIC BRONCHOSCOPE DUAL AXIS SWIVEL

## (undated) DEVICE — VENODYNE/SCD SLEEVE CALF MEDIUM

## (undated) DEVICE — ELCTR EXTENSION STRAIGHT

## (undated) DEVICE — TAPE SILK 3"

## (undated) DEVICE — SUT SILK 0 18" TIES

## (undated) DEVICE — SOL ANTI FOG

## (undated) DEVICE — PREP CHLORAPREP HI-LITE ORANGE 26ML

## (undated) DEVICE — WARMING BLANKET LOWER ADULT

## (undated) DEVICE — SOL IRR POUR NS 0.9% 500ML

## (undated) DEVICE — SUT VICRYL 0 27" CT-1 UNDYED

## (undated) DEVICE — FOLEY TRAY 16FR 5CC LF UMETER CLOSED

## (undated) DEVICE — DRSG STERISTRIPS 0.5 X 4"

## (undated) DEVICE — APPLICATOR VISTASEAL LAP DUAL 35CM RIGID

## (undated) DEVICE — NDL HYPO REGULAR BEVEL 25G X 1.5" (BLUE)

## (undated) DEVICE — DRAPE 3/4 SHEET 52X76"

## (undated) DEVICE — DRAPE IOBAN 33" X 23"

## (undated) DEVICE — SUT VICRYL 3-0 27" SH UNDYED

## (undated) DEVICE — ELCTR BOVIE TIP BLADE INSULATED 2.75" EDGE

## (undated) DEVICE — CHEST DRAIN OASIS DRY SUCTION WATER SEAL

## (undated) DEVICE — DRAPE MAYO STAND 23"

## (undated) DEVICE — CONNECTOR REDUCING STRAIGHT 3/8X0.25"

## (undated) DEVICE — ELCTR BOVIE TIP BLADE INSULATED 6.5" EDGE

## (undated) DEVICE — SUT VICRYL 2-0 27" UR-6

## (undated) DEVICE — VALVE SUCTION EVIS 160/200/240

## (undated) DEVICE — SUT MONOCRYL 4-0 27" PS-2 UNDYED

## (undated) DEVICE — DRSG CURITY GAUZE SPONGE 4 X 4" 12-PLY

## (undated) DEVICE — ENDOCATCH GENERAL 15MM (PURPLE)

## (undated) DEVICE — TRAP SPECIMEN SPUTUM 40CC

## (undated) DEVICE — SUT PROLENE 0 30" CT-1

## (undated) DEVICE — DISSECTOR ENDOSCOPIC KITTNER SINGLE TIP

## (undated) DEVICE — SYR SLIP 10CC

## (undated) DEVICE — POSITIONER STRAP ARMBOARD VELCRO TS-30

## (undated) DEVICE — TUBING SUCTION NONCONDUCTIVE 6MM X 12FT

## (undated) DEVICE — DRSG TELFA 3 X 8

## (undated) DEVICE — DRAPE MAGNETIC INSTRUMENT MEDIUM

## (undated) DEVICE — DRSG BENZOIN 0.6CC

## (undated) DEVICE — SPECIMEN CONTAINER 100ML

## (undated) DEVICE — VISITEC 4X4

## (undated) DEVICE — VALVE BIOPSY BRONCHOVIDEOSCOPE

## (undated) DEVICE — DURABLE MEDICAL EQUIPMENT: Type: DURABLE MEDICAL EQUIPMENT